# Patient Record
Sex: FEMALE | Race: WHITE | NOT HISPANIC OR LATINO
[De-identification: names, ages, dates, MRNs, and addresses within clinical notes are randomized per-mention and may not be internally consistent; named-entity substitution may affect disease eponyms.]

---

## 2018-08-17 ENCOUNTER — RESULT REVIEW (OUTPATIENT)
Age: 63
End: 2018-08-17

## 2022-08-16 ENCOUNTER — APPOINTMENT (OUTPATIENT)
Dept: ORTHOPEDIC SURGERY | Facility: CLINIC | Age: 67
End: 2022-08-16

## 2023-03-01 ENCOUNTER — APPOINTMENT (OUTPATIENT)
Dept: SURGERY | Facility: CLINIC | Age: 68
End: 2023-03-01
Payer: MEDICARE

## 2023-03-01 VITALS
TEMPERATURE: 97.1 F | HEIGHT: 64 IN | SYSTOLIC BLOOD PRESSURE: 122 MMHG | BODY MASS INDEX: 23.9 KG/M2 | HEART RATE: 82 BPM | DIASTOLIC BLOOD PRESSURE: 80 MMHG | OXYGEN SATURATION: 99 % | WEIGHT: 140 LBS

## 2023-03-01 DIAGNOSIS — Z83.3 FAMILY HISTORY OF DIABETES MELLITUS: ICD-10-CM

## 2023-03-01 PROCEDURE — 99204 OFFICE O/P NEW MOD 45 MIN: CPT

## 2023-03-01 RX ORDER — ATORVASTATIN CALCIUM 40 MG/1
40 TABLET, FILM COATED ORAL
Refills: 0 | Status: ACTIVE | COMMUNITY

## 2023-03-01 NOTE — PHYSICAL EXAM
[Normal Thyroid] : the thyroid was normal [Normal Breath Sounds] : Normal breath sounds [Normal Heart Sounds] : normal heart sounds [Normal Rate and Rhythm] : normal rate and rhythm [No HSM] : no hepatosplenomegaly [de-identified] :   Healthy [de-identified] :  no adenopathy [de-identified] :  moderate to large in size and symmetrical, mixed fatty and glandular consistency, pendulous and free of any nipple discharge, nipple retraction, suspicious skin changes bilaterally.  No discrete masses or suspicious areas palpable in the left breast.  8 to 10 mm irregular nodule palpable at the core biopsy site in the right breast 3:00 axis, consistent with biopsy site reaction.  Minimal local ecchymosis only.  No other right breast lesions palpable.  No axillary adenopathy bilaterally.

## 2023-03-01 NOTE — HISTORY OF PRESENT ILLNESS
[de-identified] :  the patient comes with her sister, who is an RN, for treatment of a biopsy-proven right breast cancer.  She did not notice any recent symptoms or changes in either breast and she has no prior history of any other breast disorders or breast surgery.  There is no family history of breast cancer.  A routine screening mammogram showed a new asymmetric density in the right breast which was confirmed on diagnostic imaging, after which a core biopsy was performed.\par \par Last GYN examination was 3 years ago, menarche was at age 11 and first pregnancy was at age 26.  She is a  who never nursed or used hormones and her menopause was at age 54

## 2023-03-01 NOTE — DATA REVIEWED
[FreeTextEntry1] : Reviewed reports of most recent breast imaging studies and right breast core biopsy.  Images unavailable at this time.\par \par Biopsy showed a 4 mm invasive carcinoma, mixed ductal and lobular, strongly HR positive, H ER negative, Ki-67 not performed.

## 2023-03-01 NOTE — ASSESSMENT
[FreeTextEntry1] : [id number]   Invasive carcinoma of the right breast, mixed ductal and lobular histology.  The nature of the problem was discussed in full and I believe that overall her prognosis will be excellent, as this is a stage I lesion in a postmenopausal female, strongly HR positive and H ER negative.  We will obtain the biopsy slides for review and confirmation of the diagnosis.  The patient will obtain the breast imaging studies on disc for local review, and a bilateral breast MRI will be done to evaluate for any other areas of concern in either breast.  They understand that additional biopsies may be necessary based on the MRI results.  She will also be referred for a consultation with our genetics counselor for possible BRCA testing, especially as it may impact her siblings and children.\par \par Barring the need for further work-up thereafter, we will proceed with definitive surgical management.  We discussed in detail the options of mastectomy, with or without immediate reconstruction, as opposed to wide local excision of the tumor site with preoperative localization and probable postoperative breast radiotherapy.  We also discussed axillary staging via formal lymph node dissection or sentinel lymph node biopsy.  The relative risks and benefits of these options were discussed in full.  They understand the increased risk of local recurrence with breast preservation, which could require a mastectomy in the future, and the small possibility of false negative axillary staging with sentinel lymph node biopsy, which would have obvious effects on her staging, her treatment decisions, and possibly her prognosis.  I believe she is an excellent candidate for the lesser surgical options and the details of the surgeries were explained in full.  After surgery she will be referred for medical and radiation oncology evaluations, and will likely be offered endocrine therapy  if she is node negative.  They will return in about 2 weeks for further discussion and final management decisions.  The case will be discussed at our multidisciplinary breast cancer conference.  The option to obtain additional surgical opinions was also offered.  All their questions were answered and they are happy with the assessment and plan.
none

## 2023-03-03 ENCOUNTER — NON-APPOINTMENT (OUTPATIENT)
Age: 68
End: 2023-03-03

## 2023-03-09 ENCOUNTER — APPOINTMENT (OUTPATIENT)
Dept: HEMATOLOGY ONCOLOGY | Facility: CLINIC | Age: 68
End: 2023-03-09

## 2023-03-09 ENCOUNTER — LABORATORY RESULT (OUTPATIENT)
Age: 68
End: 2023-03-09

## 2023-03-14 NOTE — DISCUSSION/SUMMARY
[FreeTextEntry1] : REASON FOR VISIT:\par Ms. Magda Mcbride is a 67-year-old female who was referred by Dr. Karsten Jerome for cancer genetic counseling and risk assessment due to a personal history of breast cancer. The patient was seen on 3/9/2023 through [facility]. The patient was unaccompanied.\par \par RELEVANT MEDICAL AND SURGICAL HISTORY:\par The patient was recently diagnosed with [id number], ER+/CA+/HER2-, mixed ductal and lobular invasive carcinoma of the right breast at the age of 67. She met with Dr. Karsten Jerome on 3/1/2023 to discuss her diagnosis. She will be going for a breast MRI next week.\par \par OTHER MEDICAL AND SURGICAL HISTORY:\par • None \par \par PAST OB/GYN HISTORY:\par Obstetrical History: \par Age at Menarche: 11\par Post-Menopausal: 54\par Age at First Live Birth: 26\par Oral Contraceptive Use: None\par Hormone Replacement Therapy: None\par \par CANCER SCREENING HISTORY: \par BREAST: Patient reported no prior biopsies.\par GYN: Last visit 10/2022. Frequency: annual. Patient reported no abnormalities.\par Colon: Last colonoscopy 8-9 years ago, the patient reported no polyps. Frequency: 10 years.\par Skin: Patient reported benign skin tags removed. Frequency: annual.\par \par SOCIAL HISTORY:\par • Tobacco-product use: None\par \par FAMILY HISTORY:\par Paternal ancestry was reported as [place]i and maternal ancestry was reported as Somali. A detailed family history of cancer was ascertained, see below for pedigree. Of note:\par • Maternal grandfather with a possible abdominal cancer at age 24\par • Paternal grandfather with unknown cancer in his 70s\par \par The remaining family history is unremarkable. According to the patient there are no other known cases of significant cancers in the family. To her knowledge no one else in the family has had germline testing for cancer susceptibility. \par 	\par RISK ASSESSMENT:\par Ms. Mcbride's personal and family history of cancer may be suggestive of a hereditary cancer susceptibility syndrome. Variants in breast cancer susceptibility genes were of specific concern. \par 	 \par We discussed the option of genetic testing for a breast cancer panel.  \par \par We discussed the risks, benefits and limitations, financial cost and implications of genetic testing. We also discussed the psychosocial implications of genetic testing. Possible test results were reviewed with the patient, along with associated medical management options. The Genetic Information Non-discrimination Act (ABBE) was also reviewed.\par \par After further discussion, the patient expressed hesitancy and anxiety regarding the test. We discussed the pros and cons of testing. After a detailed discussion, the patient declined testing at this time. She was informed we would be happy to see and test her in the future if she changes her mind. No further testing was ordered today.\par \par PLAN:\par 1. We reviewed the pros and cons of genetic testing for hereditary cancer predisposition.\par 2. After a detailed discussion, the patient declined testing. No further testing was ordered today. \par \par For any additional questions please call Cancer Genetics at [phone] or [phone].\par \par \par Jemima Sifuentes MS, [facility]\par Genetic Counselor, Cancer Genetics\par \par \par

## 2023-03-16 ENCOUNTER — OUTPATIENT (OUTPATIENT)
Dept: OUTPATIENT SERVICES | Facility: HOSPITAL | Age: 68
LOS: 1 days | End: 2023-03-16
Payer: MEDICARE

## 2023-03-16 ENCOUNTER — RESULT REVIEW (OUTPATIENT)
Age: 68
End: 2023-03-16

## 2023-03-16 DIAGNOSIS — C50.911 MALIGNANT NEOPLASM OF UNSPECIFIED SITE OF RIGHT FEMALE BREAST: ICD-10-CM

## 2023-03-16 DIAGNOSIS — Z00.8 ENCOUNTER FOR OTHER GENERAL EXAMINATION: ICD-10-CM

## 2023-03-16 PROCEDURE — 77049 MRI BREAST C-+ W/CAD BI: CPT | Mod: 52

## 2023-03-16 PROCEDURE — A9579: CPT

## 2023-03-16 PROCEDURE — 77049 MRI BREAST C-+ W/CAD BI: CPT | Mod: 26,52

## 2023-03-17 DIAGNOSIS — C50.911 MALIGNANT NEOPLASM OF UNSPECIFIED SITE OF RIGHT FEMALE BREAST: ICD-10-CM

## 2023-03-20 ENCOUNTER — APPOINTMENT (OUTPATIENT)
Dept: SURGERY | Facility: CLINIC | Age: 68
End: 2023-03-20
Payer: MEDICARE

## 2023-03-20 VITALS
HEART RATE: 82 BPM | OXYGEN SATURATION: 98 % | WEIGHT: 140 LBS | BODY MASS INDEX: 23.9 KG/M2 | TEMPERATURE: 96.8 F | DIASTOLIC BLOOD PRESSURE: 84 MMHG | HEIGHT: 64 IN | SYSTOLIC BLOOD PRESSURE: 122 MMHG

## 2023-03-20 PROCEDURE — 99214 OFFICE O/P EST MOD 30 MIN: CPT

## 2023-03-20 RX ORDER — LOSARTAN POTASSIUM AND HYDROCHLOROTHIAZIDE 25; 100 MG/1; MG/1
100-25 TABLET ORAL
Qty: 90 | Refills: 0 | Status: ACTIVE | COMMUNITY
Start: 2023-02-09

## 2023-03-20 RX ORDER — LEVOTHYROXINE SODIUM 0.03 MG/1
25 TABLET ORAL
Qty: 90 | Refills: 0 | Status: ACTIVE | COMMUNITY
Start: 2023-02-09

## 2023-03-20 NOTE — PHYSICAL EXAM
[de-identified] :   Healthy [de-identified] :  no adenopathy [de-identified] :  moderate to large in size, symmetrical and pendulous.  Resolving ecchymosis in the inner aspect of the right breast.  No palpable masses or suspicious areas noted bilaterally.  No axillary adenopathy bilaterally.

## 2023-03-20 NOTE — HISTORY OF PRESENT ILLNESS
[de-identified] :  the patient returns with her sister for further discussion regarding the management of her right breast cancer.  She notes no new symptoms or changes in either breast region.  She was seen by the genetic  counselor and decided against BRCA testing at this time.\par \par T1a N0, HR positive, H ER negative invasive ductal carcinoma of the right breast.

## 2023-03-20 NOTE — REASON FOR VISIT
[Follow-Up: _____] : a [unfilled] follow-up visit [Family Member] : family member [FreeTextEntry1] : Right breast cancer

## 2023-03-20 NOTE — DATA REVIEWED
[FreeTextEntry1] :   Breast MRI showed no other areas of concern in either breast and no regional lymphadenopathy.\par \par In-house review of the biopsy slides confirmed the existing diagnosis.

## 2023-03-20 NOTE — ASSESSMENT
[FreeTextEntry1] :   cT1aN0 right breast IDC, HR positive, H ER negative, no Ki-67 done on the biopsy specimen.  The details of the diagnosis were discussed in full along with management options and their relative risks and benefits we discussed management of the primary lesion with mastectomy and possible immediate reconstruction versus wide local excision with preoperative localization and probable postoperative radiation therapy.  We discussed management of the axilla with axillary dissection versus sentinel lymph node biopsy.  They understand the increased local recurrence risk with breast preservation, which may require a mastectomy in the future, and the small chance of false negative axillary staging with sentinel node biopsy, which would have obvious effects on her treatment decisions and possibly her prognosis.  I believe she is an excellent candidate for breast preservation and sentinel lymph node biopsy.  Postoperatively she will have medical and radiation oncology evaluations, and the option to obtain additional surgical opinions preoperatively was also discussed and declined.  We also discussed the possible cosmetic sequela I with breast preservation and postop breast radiotherapy.  All their questions were answered and they are happy with the assessment and recommendations and we will schedule the surgery promptly.  A surgical consent was obtained at this office visit.

## 2023-03-27 ENCOUNTER — RESULT REVIEW (OUTPATIENT)
Age: 68
End: 2023-03-27

## 2023-03-27 ENCOUNTER — OUTPATIENT (OUTPATIENT)
Dept: OUTPATIENT SERVICES | Facility: HOSPITAL | Age: 68
LOS: 1 days | End: 2023-03-27
Payer: MEDICARE

## 2023-03-27 VITALS
OXYGEN SATURATION: 100 % | HEART RATE: 80 BPM | HEIGHT: 64 IN | RESPIRATION RATE: 18 BRPM | DIASTOLIC BLOOD PRESSURE: 82 MMHG | SYSTOLIC BLOOD PRESSURE: 143 MMHG | TEMPERATURE: 98 F | WEIGHT: 141.1 LBS

## 2023-03-27 DIAGNOSIS — Z98.890 OTHER SPECIFIED POSTPROCEDURAL STATES: Chronic | ICD-10-CM

## 2023-03-27 DIAGNOSIS — Z01.818 ENCOUNTER FOR OTHER PREPROCEDURAL EXAMINATION: ICD-10-CM

## 2023-03-27 DIAGNOSIS — C50.911 MALIGNANT NEOPLASM OF UNSPECIFIED SITE OF RIGHT FEMALE BREAST: ICD-10-CM

## 2023-03-27 PROCEDURE — 99214 OFFICE O/P EST MOD 30 MIN: CPT | Mod: 25

## 2023-03-27 PROCEDURE — 71046 X-RAY EXAM CHEST 2 VIEWS: CPT

## 2023-03-27 PROCEDURE — 93005 ELECTROCARDIOGRAM TRACING: CPT

## 2023-03-27 PROCEDURE — 93010 ELECTROCARDIOGRAM REPORT: CPT

## 2023-03-27 PROCEDURE — 71046 X-RAY EXAM CHEST 2 VIEWS: CPT | Mod: 26

## 2023-03-27 NOTE — H&P PST ADULT - HISTORY OF PRESENT ILLNESS
Pt denies cp palp uri cough dysuria or sob.   ET: 2 -3 FOS- denies SOB .  ET 2-3 FLAT BLOCKS DENIES SOB       PT denies any open wounds, drainage or rashes. . aware to self quaerantine preop. all instructions reviewed.  SCHEDULED  FOR 4/12/23 BRIDGET EXCISIONAL BIOPSY OF RIGHT BREAST  MASS WITH PREOP RADIOFREQUENCY ALIZER AND SENTINEL LYMPH NODE BIOPSY RIGHT AXIILLA  DENIES PAIN  RECENT MAMMO AND ULTRASOUND /BIOPSY  As per patient, this is their complete medical and surgical history, including medications both prescribed or over the counter.  Patient verbalized understanding of instructions and was given the opportunity to ask questions and have them answered.  Patient denies any signs or symptoms of COVID 19 and denies contact with known positive individuals.  They have an appointment for repeat COVID testing pre-procedure and acknowledge its time and place.  They were instructed to quarantine pre-procedure, practice exposure control measures, continue to self-monitor and report any concerns to their proceduralist.    Anesthesia Alert  NO--Difficult Airway  NO--History of neck surgery or radiation  NO--Limited ROM of neck  NO--History of Malignant hyperthermia  NO--Personal or family history of Pseudocholinesterase deficiency.  yes--Prior Anesthesia Complication   nausea/vomit post op  NO--Latex Allergy  NO--Loose teeth  NO--History of Rheumatoid Arthritis  NO--NABILA  NO--Bleeding risk  NO--Other_____

## 2023-03-28 DIAGNOSIS — Z01.818 ENCOUNTER FOR OTHER PREPROCEDURAL EXAMINATION: ICD-10-CM

## 2023-03-28 DIAGNOSIS — C50.911 MALIGNANT NEOPLASM OF UNSPECIFIED SITE OF RIGHT FEMALE BREAST: ICD-10-CM

## 2023-04-10 ENCOUNTER — RESULT REVIEW (OUTPATIENT)
Age: 68
End: 2023-04-10

## 2023-04-10 ENCOUNTER — OUTPATIENT (OUTPATIENT)
Dept: OUTPATIENT SERVICES | Facility: HOSPITAL | Age: 68
LOS: 1 days | End: 2023-04-10
Payer: MEDICARE

## 2023-04-10 ENCOUNTER — NON-APPOINTMENT (OUTPATIENT)
Age: 68
End: 2023-04-10

## 2023-04-10 DIAGNOSIS — R92.8 OTHER ABNORMAL AND INCONCLUSIVE FINDINGS ON DIAGNOSTIC IMAGING OF BREAST: ICD-10-CM

## 2023-04-10 DIAGNOSIS — Z98.890 OTHER SPECIFIED POSTPROCEDURAL STATES: Chronic | ICD-10-CM

## 2023-04-10 DIAGNOSIS — C50.911 MALIGNANT NEOPLASM OF UNSPECIFIED SITE OF RIGHT FEMALE BREAST: ICD-10-CM

## 2023-04-10 DIAGNOSIS — Z85.3 PERSONAL HISTORY OF MALIGNANT NEOPLASM OF BREAST: ICD-10-CM

## 2023-04-10 PROCEDURE — 19281 PERQ DEVICE BREAST 1ST IMAG: CPT | Mod: RT

## 2023-04-10 PROCEDURE — A4648: CPT

## 2023-04-11 ENCOUNTER — RESULT REVIEW (OUTPATIENT)
Age: 68
End: 2023-04-11

## 2023-04-11 ENCOUNTER — OUTPATIENT (OUTPATIENT)
Dept: OUTPATIENT SERVICES | Facility: HOSPITAL | Age: 68
LOS: 1 days | End: 2023-04-11
Payer: MEDICARE

## 2023-04-11 DIAGNOSIS — C50.919 MALIGNANT NEOPLASM OF UNSPECIFIED SITE OF UNSPECIFIED FEMALE BREAST: ICD-10-CM

## 2023-04-11 DIAGNOSIS — Z98.890 OTHER SPECIFIED POSTPROCEDURAL STATES: Chronic | ICD-10-CM

## 2023-04-11 DIAGNOSIS — Z00.8 ENCOUNTER FOR OTHER GENERAL EXAMINATION: ICD-10-CM

## 2023-04-11 PROCEDURE — 78195 LYMPH SYSTEM IMAGING: CPT

## 2023-04-11 PROCEDURE — 78195 LYMPH SYSTEM IMAGING: CPT | Mod: 26

## 2023-04-11 PROCEDURE — A9541: CPT

## 2023-04-11 NOTE — ASU PATIENT PROFILE, ADULT - FALL HARM RISK - PT AGE POPULATION HIDDEN
Call to patient; advised of recommendations of Nathaly Gallegos NP.  Patient agreeable.  Follow up appointment scheduled.    Adult

## 2023-04-11 NOTE — ASU PATIENT PROFILE, ADULT - FALL HARM RISK - UNIVERSAL INTERVENTIONS
Bed in lowest position, wheels locked, appropriate side rails in place/Call bell, personal items and telephone in reach/Instruct patient to call for assistance before getting out of bed or chair/Non-slip footwear when patient is out of bed/[place] to call system/Physically safe environment - no spills, clutter or unnecessary equipment/Purposeful Proactive Rounding/Room/bathroom lighting operational, light cord in reach

## 2023-04-12 ENCOUNTER — TRANSCRIPTION ENCOUNTER (OUTPATIENT)
Age: 68
End: 2023-04-12

## 2023-04-12 ENCOUNTER — RESULT REVIEW (OUTPATIENT)
Age: 68
End: 2023-04-12

## 2023-04-12 ENCOUNTER — OUTPATIENT (OUTPATIENT)
Dept: INPATIENT UNIT | Facility: HOSPITAL | Age: 68
LOS: 1 days | Discharge: HOME | End: 2023-04-12
Payer: MEDICARE

## 2023-04-12 ENCOUNTER — APPOINTMENT (OUTPATIENT)
Dept: SURGERY | Facility: AMBULATORY SURGERY CENTER | Age: 68
End: 2023-04-12

## 2023-04-12 VITALS
DIASTOLIC BLOOD PRESSURE: 61 MMHG | OXYGEN SATURATION: 99 % | SYSTOLIC BLOOD PRESSURE: 132 MMHG | RESPIRATION RATE: 20 BRPM | HEART RATE: 59 BPM | TEMPERATURE: 98 F

## 2023-04-12 VITALS
HEART RATE: 72 BPM | WEIGHT: 141.1 LBS | TEMPERATURE: 97 F | OXYGEN SATURATION: 100 % | DIASTOLIC BLOOD PRESSURE: 64 MMHG | SYSTOLIC BLOOD PRESSURE: 123 MMHG | RESPIRATION RATE: 18 BRPM | HEIGHT: 64 IN

## 2023-04-12 DIAGNOSIS — Z98.890 OTHER SPECIFIED POSTPROCEDURAL STATES: Chronic | ICD-10-CM

## 2023-04-12 DIAGNOSIS — C50.911 MALIGNANT NEOPLASM OF UNSPECIFIED SITE OF RIGHT FEMALE BREAST: ICD-10-CM

## 2023-04-12 DIAGNOSIS — C50.919 MALIGNANT NEOPLASM OF UNSPECIFIED SITE OF UNSPECIFIED FEMALE BREAST: ICD-10-CM

## 2023-04-12 PROCEDURE — 88307 TISSUE EXAM BY PATHOLOGIST: CPT

## 2023-04-12 PROCEDURE — 88341 IMHCHEM/IMCYTCHM EA ADD ANTB: CPT

## 2023-04-12 PROCEDURE — 12032 INTMD RPR S/A/T/EXT 2.6-7.5: CPT | Mod: 59

## 2023-04-12 PROCEDURE — 19301 PARTIAL MASTECTOMY: CPT | Mod: RT

## 2023-04-12 PROCEDURE — 88342 IMHCHEM/IMCYTCHM 1ST ANTB: CPT | Mod: 26

## 2023-04-12 PROCEDURE — 88341 IMHCHEM/IMCYTCHM EA ADD ANTB: CPT | Mod: 26

## 2023-04-12 PROCEDURE — 38525 BIOPSY/REMOVAL LYMPH NODES: CPT | Mod: RT

## 2023-04-12 PROCEDURE — C1889: CPT

## 2023-04-12 PROCEDURE — 88305 TISSUE EXAM BY PATHOLOGIST: CPT | Mod: 26

## 2023-04-12 PROCEDURE — 88305 TISSUE EXAM BY PATHOLOGIST: CPT

## 2023-04-12 PROCEDURE — 88342 IMHCHEM/IMCYTCHM 1ST ANTB: CPT

## 2023-04-12 PROCEDURE — 88307 TISSUE EXAM BY PATHOLOGIST: CPT | Mod: 26

## 2023-04-12 PROCEDURE — 38900 IO MAP OF SENT LYMPH NODE: CPT | Mod: RT

## 2023-04-12 RX ORDER — HYDROMORPHONE HYDROCHLORIDE 2 MG/ML
0.5 INJECTION INTRAMUSCULAR; INTRAVENOUS; SUBCUTANEOUS
Refills: 0 | Status: DISCONTINUED | OUTPATIENT
Start: 2023-04-12 | End: 2023-04-12

## 2023-04-12 RX ORDER — LEVOTHYROXINE SODIUM 125 MCG
1 TABLET ORAL
Refills: 0 | DISCHARGE

## 2023-04-12 RX ORDER — ONDANSETRON 8 MG/1
4 TABLET, FILM COATED ORAL ONCE
Refills: 0 | Status: DISCONTINUED | OUTPATIENT
Start: 2023-04-12 | End: 2023-04-12

## 2023-04-12 RX ORDER — SODIUM CHLORIDE 9 MG/ML
1000 INJECTION, SOLUTION INTRAVENOUS
Refills: 0 | Status: DISCONTINUED | OUTPATIENT
Start: 2023-04-12 | End: 2023-04-12

## 2023-04-12 RX ORDER — ATORVASTATIN CALCIUM 80 MG/1
1 TABLET, FILM COATED ORAL
Refills: 0 | DISCHARGE

## 2023-04-12 RX ORDER — LOSARTAN/HYDROCHLOROTHIAZIDE 100MG-25MG
1 TABLET ORAL
Refills: 0 | DISCHARGE

## 2023-04-12 RX ORDER — OXYCODONE HYDROCHLORIDE 5 MG/1
5 TABLET ORAL ONCE
Refills: 0 | Status: DISCONTINUED | OUTPATIENT
Start: 2023-04-12 | End: 2023-04-12

## 2023-04-12 RX ORDER — ACETAMINOPHEN WITH CODEINE 300MG-30MG
1 TABLET ORAL
Qty: 12 | Refills: 0
Start: 2023-04-12 | End: 2023-04-14

## 2023-04-12 NOTE — ASU DISCHARGE PLAN (ADULT/PEDIATRIC) - CARE PROVIDER_API CALL
Karsten Jerome (MD)  Surgery  [address] [place] [address], [address] [place] [address]  Phone: [phone]  Fax: [phone]  Follow Up Time:

## 2023-04-12 NOTE — PRE-ANESTHESIA EVALUATION ADULT - NSANTHPMHFT_GEN_ALL_CORE
METS>4 without CP/palpitations/sob  Denies orthopnea    h/o abnormal EKG, but workup there after negative for cad per patient. very active, plays racnodilaall

## 2023-04-12 NOTE — CHART NOTE - NSCHARTNOTEFT_GEN_A_CORE
PACU ANESTHESIA ADMISSION NOTE      Procedure:   Post op diagnosis:      ____  Intubated  TV:______       Rate: ______      FiO2: ______    _x___  Patent Airway    _x___  Full return of protective reflexes    _x___  Full recovery from anesthesia / back to baseline status    Vitals:  T 97.7 f  HR: 61  BP: 126/56  RR: 16  SpO2: 100% on RA    Mental Status:  _x___ Awake   ___x__ Alert   _____ Drowsy   _____ Sedated    Nausea/Vomiting:  _x___  NO       ______Yes,   See Post - Op Orders         Pain Scale (0-10):  __0___    Treatment: _x___ None    ____ See Post - Op/PCA Orders    Post - Operative Fluids:   __x__ Oral   _x___ See Post - Op Orders    Plan: Discharge:   _x___Home       _____Floor     _____Critical Care    _____  Other:_________________    Comments: uneventful MAC, VSS, full report to pacu rn  No anesthesia issues or complications noted.  Discharge when criteria met.

## 2023-04-12 NOTE — BRIEF OPERATIVE NOTE - NSICDXBRIEFPROCEDURE_GEN_ALL_CORE_FT
PROCEDURES:  Excision of neoplasm of right breast 12-Apr-2023 13:46:48  Richar Jaffe  Biopsy, breast and sentinel lymph node 12-Apr-2023 13:47:49  Richar Jaffe

## 2023-04-12 NOTE — BRIEF OPERATIVE NOTE - OPERATION/FINDINGS
Wide local excision of right breast invasive ductal carcinoma with additional posterior, superior, inferior, medial, and lateral margins.  Specimen mammogram satisfactory, RF seed # [id number]. Right axillary entinel lymph node biopsy, 650.

## 2023-04-12 NOTE — ASU DISCHARGE PLAN (ADULT/PEDIATRIC) - ASU DC SPECIAL INSTRUCTIONSFT
You are being discharged from [facility] follow your excision of left breast mass with sentinel node biopsy. Please call the office to schedule an appointment in 2 weeks. Please take Tylenol with codeine prescribed to you for pain 1-2 tabs every 6 hours. You may also take ibuprofen 400 mg and tylenol 625 mg every 6 hours as needed for pain. Resume all home medications as instructed. Leave the dressing and bra in place for 48 hours, you may remove the dressing at this time and shower, but please leave the steristrips in place as these can get wet and will fall off on their own. You may return to your regular diet. Please increase your activity as tolerated, but no strenuous use of your left upper extremity over the next 3 weeks. Be sure to wear your sports bra as frequently as possible until follow up. Please call the office or return to the ED if you experience purulent drainage from your incision, numbness/tingling in either upper extremity, fevers greater than 100.4F, pain not controlled by your pain medications. If you have any questions or concerns please call the office with the number provided. You are being discharged from [facility] follow your excision of right breast mass with sentinel node biopsy. Please call the office to schedule an appointment in 2 weeks. Please take Tylenol with codeine prescribed to you for pain 1-2 tabs every 6 hours. You may also take ibuprofen 400 mg and tylenol 625 mg every 6 hours as needed for pain. Resume all home medications as instructed. Leave the dressing and bra in place for 48 hours, you may remove the dressing at this time and shower, but please leave the steristrips in place as these can get wet and will fall off on their own. You may return to your regular diet. Please increase your activity as tolerated, but no strenuous use of your right upper extremity over the next 3 weeks. Be sure to wear your sports bra as frequently as possible until follow up. Please call the office or return to the ED if you experience purulent drainage from your incision, numbness/tingling in either upper extremity, fevers greater than 100.4F, pain not controlled by your pain medications. If you have any questions or concerns please call the office with the number provided.

## 2023-04-12 NOTE — ASU DISCHARGE PLAN (ADULT/PEDIATRIC) - NS MD DC FALL RISK RISK
PAST SURGICAL HISTORY:  H/O: hysterectomy     S/P cataract surgery     
For information on Fall & Injury Prevention, visit: [email or web address] OR  [email or web address] OR  https://www.cdc.gov/steadi/patient.html

## 2023-04-12 NOTE — BRIEF OPERATIVE NOTE - PRIMARY SURGEON
Called Maia back. Questions answered.    [facility]    Phone Message    May a detailed message be left on voicemail: yes    Reason for Call: Other: Pt wants Dr. Bernadine Maria to know that pt went to the [facility] lab instead of [place] [facility], pt didn't drive to [place] to get pkg that was left for pt in [place], pt needs Dr. Bernadine Maria's team to call her with pkg detalis  and what medications she needs to take.. Please call the pt back, thank you     Action Taken: Message routed to:  [facility] ([facility] [place]  
Dr. Jerome

## 2023-04-12 NOTE — ASU DISCHARGE PLAN (ADULT/PEDIATRIC) - HISTORY OF COVID-19 VACCINATION
Patient returned to clinic requesting RTW extension to cover today.  RTW: 1- ([facility]) provided.    Yes

## 2023-04-18 DIAGNOSIS — C77.3 SECONDARY AND UNSPECIFIED MALIGNANT NEOPLASM OF AXILLA AND UPPER LIMB LYMPH NODES: ICD-10-CM

## 2023-04-18 DIAGNOSIS — D05.11 INTRADUCTAL CARCINOMA IN SITU OF RIGHT BREAST: ICD-10-CM

## 2023-04-18 LAB — SURGICAL PATHOLOGY STUDY: SIGNIFICANT CHANGE UP

## 2023-04-24 ENCOUNTER — APPOINTMENT (OUTPATIENT)
Dept: SURGERY | Facility: CLINIC | Age: 68
End: 2023-04-24
Payer: MEDICARE

## 2023-04-24 VITALS
TEMPERATURE: 96.8 F | HEART RATE: 87 BPM | SYSTOLIC BLOOD PRESSURE: 122 MMHG | OXYGEN SATURATION: 98 % | HEIGHT: 64 IN | BODY MASS INDEX: 24.24 KG/M2 | DIASTOLIC BLOOD PRESSURE: 82 MMHG | WEIGHT: 142 LBS

## 2023-04-24 PROCEDURE — 99024 POSTOP FOLLOW-UP VISIT: CPT

## 2023-04-24 NOTE — PHYSICAL EXAM
[de-identified] :  right breast and axillary incisions are clean and dry, with no evidence of hematoma or infection.  Breast contour is well-preserved

## 2023-04-24 NOTE — DATA REVIEWED
[FreeTextEntry1] :   Final pathology shows a 5.2 mm IDC with questionable LVI and negative margins.  Prognostic profile shows 95% ER positive, 90% NJ positive, H ER negative

## 2023-04-24 NOTE — ASSESSMENT
[FreeTextEntry1] : No postoperative problems noted; local care and activity instructions were reviewed, and she will return in about 4 weeks for reevaluation.\par \par  T1b N0i  M0 IDC right breast with a very favorable biomarker profile.   I believe that overall her prognosis will be excellent, but arrangements will be made for prompt medical and radiation oncology evaluations.  She will likely require adjuvant endocrine therapy and breast RT, but we have also requested an Oncotype RS to see if there will be any predicted benefit from adjuvant chemotherapy.   Small metallic marking clips were left in the breast at the time of surgery to delineate the lumpectomy cavity.    All their questions were answered and they understand and agree.

## 2023-04-24 NOTE — HISTORY OF PRESENT ILLNESS
[de-identified] : The patient returns with her sister for her first postoperative visit after her recent right breast lumpectomy and sentinel lymph node biopsy.  She looks and feels well and did not require any prescription analgesics.  She has no complaints regarding the surgery.

## 2023-04-25 PROBLEM — Q24.5 MALFORMATION OF CORONARY VESSELS: Chronic | Status: ACTIVE | Noted: 2023-03-27

## 2023-04-30 NOTE — HISTORY OF PRESENT ILLNESS
[de-identified] : 68 yo F with PMHx of HTN, DL, abnormal stress test (ant wall ischemia), referred for new diagnosis of right breast cancer.\par \par She had routine screening mammogram done on    which showed a new asymmetric density in the right breast which was \par confirmed on diagnostic imaging, after which a core biopsy was performed. \par Biopsy pathology confirmed right breast invasive carcinoma with mixed ductal and lobular features, ER/DC +, HER2 neg.\par \par She had MRI breast done as well. She was seen by breast surgery and is s/p right breast lumpectomy with sentinel lymph node biopsy on 23. \par \par Pathology:\par 1. R LIQ breast mass: Well differentiated ductal carcinoma. 5.2mm with focal lobular features including signet ring formation, grade 1.\par     Non-extensive DCIS, solid type, intermediate grade. \par     A focus suspicious for LVI seen. Alkl margins negative of carcinoma. \par 2. 1 LN with micrometastases 0.47mm\par pT1b pN1mi Mx AJCC stage IB\par \par Last GYN exam was 3 years ago.\par Menarche: Age 11\par 1st pregnancy at age 26\par , never nursed, no OCP use\par Menopause: Age 54\par \par She has no family hx of breast cancer. She was seen by genetic counselling and she declined genetic testing. Oncotype RS sent.\par \par

## 2023-04-30 NOTE — ASSESSMENT
[FreeTextEntry1] : \par \par Stage IB right invasive breast cancer grade 1, pT1B pN1mi Mx\par \par -RT\par -Oncotype (Tumor >0.5cm, with pN1mi)\par -Endocrine therapy +/- chemo (depending upon RS)\par -Genetic testing? declined\par

## 2023-05-01 ENCOUNTER — NON-APPOINTMENT (OUTPATIENT)
Age: 68
End: 2023-05-01

## 2023-05-01 ENCOUNTER — APPOINTMENT (OUTPATIENT)
Dept: HEMATOLOGY ONCOLOGY | Facility: CLINIC | Age: 68
End: 2023-05-01

## 2023-05-01 PROBLEM — E03.9 HYPOTHYROIDISM, UNSPECIFIED: Chronic | Status: ACTIVE | Noted: 2023-03-27

## 2023-05-01 PROBLEM — I10 ESSENTIAL (PRIMARY) HYPERTENSION: Chronic | Status: ACTIVE | Noted: 2023-03-27

## 2023-05-04 ENCOUNTER — NON-APPOINTMENT (OUTPATIENT)
Age: 68
End: 2023-05-04

## 2023-05-15 ENCOUNTER — OUTPATIENT (OUTPATIENT)
Dept: OUTPATIENT SERVICES | Facility: HOSPITAL | Age: 68
LOS: 1 days | End: 2023-05-15
Payer: MEDICARE

## 2023-05-15 ENCOUNTER — APPOINTMENT (OUTPATIENT)
Dept: HEMATOLOGY ONCOLOGY | Facility: CLINIC | Age: 68
End: 2023-05-15
Payer: MEDICARE

## 2023-05-15 VITALS
RESPIRATION RATE: 16 BRPM | HEIGHT: 64 IN | BODY MASS INDEX: 24.24 KG/M2 | TEMPERATURE: 98.6 F | DIASTOLIC BLOOD PRESSURE: 60 MMHG | SYSTOLIC BLOOD PRESSURE: 136 MMHG | HEART RATE: 78 BPM | WEIGHT: 142 LBS | OXYGEN SATURATION: 99 %

## 2023-05-15 DIAGNOSIS — Z98.890 OTHER SPECIFIED POSTPROCEDURAL STATES: Chronic | ICD-10-CM

## 2023-05-15 DIAGNOSIS — C50.911 MALIGNANT NEOPLASM OF UNSPECIFIED SITE OF RIGHT FEMALE BREAST: ICD-10-CM

## 2023-05-15 PROCEDURE — 99205 OFFICE O/P NEW HI 60 MIN: CPT

## 2023-05-16 ENCOUNTER — APPOINTMENT (OUTPATIENT)
Dept: RADIATION ONCOLOGY | Facility: HOSPITAL | Age: 68
End: 2023-05-16
Payer: MEDICARE

## 2023-05-16 ENCOUNTER — OUTPATIENT (OUTPATIENT)
Dept: OUTPATIENT SERVICES | Facility: HOSPITAL | Age: 68
LOS: 1 days | End: 2023-05-16
Payer: MEDICARE

## 2023-05-16 VITALS
WEIGHT: 144 LBS | TEMPERATURE: 97.7 F | HEIGHT: 64 IN | BODY MASS INDEX: 24.59 KG/M2 | SYSTOLIC BLOOD PRESSURE: 133 MMHG | RESPIRATION RATE: 16 BRPM | OXYGEN SATURATION: 97 % | DIASTOLIC BLOOD PRESSURE: 63 MMHG | HEART RATE: 67 BPM

## 2023-05-16 DIAGNOSIS — C50.911 MALIGNANT NEOPLASM OF UNSPECIFIED SITE OF RIGHT FEMALE BREAST: ICD-10-CM

## 2023-05-16 DIAGNOSIS — Z86.39 PERSONAL HISTORY OF OTHER ENDOCRINE, NUTRITIONAL AND METABOLIC DISEASE: ICD-10-CM

## 2023-05-16 DIAGNOSIS — Z98.890 OTHER SPECIFIED POSTPROCEDURAL STATES: Chronic | ICD-10-CM

## 2023-05-16 DIAGNOSIS — I10 ESSENTIAL (PRIMARY) HYPERTENSION: ICD-10-CM

## 2023-05-16 DIAGNOSIS — Z78.9 OTHER SPECIFIED HEALTH STATUS: ICD-10-CM

## 2023-05-16 DIAGNOSIS — E78.5 HYPERLIPIDEMIA, UNSPECIFIED: ICD-10-CM

## 2023-05-16 DIAGNOSIS — Z87.898 PERSONAL HISTORY OF OTHER SPECIFIED CONDITIONS: ICD-10-CM

## 2023-05-16 PROCEDURE — 99205 OFFICE O/P NEW HI 60 MIN: CPT

## 2023-05-16 RX ORDER — LEVOTHYROXINE SODIUM 75 UG/1
75 TABLET ORAL
Refills: 0 | Status: DISCONTINUED | COMMUNITY
End: 2023-05-16

## 2023-05-16 RX ORDER — LOSARTAN POTASSIUM 100 MG/1
100 TABLET, FILM COATED ORAL
Refills: 0 | Status: DISCONTINUED | COMMUNITY
End: 2023-05-16

## 2023-05-16 RX ORDER — ACETAMINOPHEN AND CODEINE PHOSPHATE 300; 15 MG/1; MG/1
300-15 TABLET ORAL
Qty: 12 | Refills: 0 | Status: DISCONTINUED | COMMUNITY
Start: 2023-04-12 | End: 2023-05-16

## 2023-05-16 RX ORDER — BUSPIRONE HYDROCHLORIDE 10 MG/1
10 TABLET ORAL
Qty: 60 | Refills: 0 | Status: DISCONTINUED | COMMUNITY
Start: 2023-01-27 | End: 2023-05-16

## 2023-05-16 NOTE — VITALS
[Date: ____________] : Patient's last distress assessment performed on [unfilled]. [3 - Distress Level] : Distress Level: 3 [Referred Patient  to social work for follow-up] : Patient was referred to social work for follow-up [Patient given social work contact information and resource sheet] : Patient was given social work contact information and resource sheet [Maximal Pain Intensity: 0/10] : 0/10 [90: Able to carry normal activity; minor signs or symptoms of disease.] : 90: Able to carry normal activity; minor signs or symptoms of disease.

## 2023-05-17 ENCOUNTER — NON-APPOINTMENT (OUTPATIENT)
Age: 68
End: 2023-05-17

## 2023-05-17 NOTE — REVIEW OF SYSTEMS
[Negative] : Psychiatric [Patient Intake Form Reviewed] : Patient intake form was reviewed [Chest Pain] : no chest pain [Palpitations] : no palpitations [Shortness Of Breath] : no shortness of breath [Wheezing] : no wheezing [Cough] : no cough [Hot Flashes] : no hot flashes

## 2023-05-17 NOTE — HISTORY OF PRESENT ILLNESS
[FreeTextEntry1] : \par The patient is a 67 year old woman who was noted on screening mammogram (1/10/2023) to have focal asymmetry in the right breast, lower inner quadrant.  Diagnostic mammogram and ultrasound on 1/25/2023 showed  right breast, lower inner asymmetry/distortion appears concerning.  BI-RADS 4: Suspicious. Recommend stereotactic biopsy.  \par \par On 2/16/2023  she had a biopsy of the right breast abnormality, lower inner and pathology showed invasive mammary carcinoma with mixed ductal and lobular features.  \par \par On 3/9/2023, outside slides were reviewed at [facility] from [facility] and pathology showed, invasive well to moderately differentiated ductal carcinoma with focal lobular features and signet ring cell formation.  Atrophic predominantly fatty breast tissue with a microscopic  perilobular capillary hemangioma. ER/DE positive, HER 2 negative.\par \par She also had an MRI of bilateral breasts on 3/16/2023, which showed right breast, small irregular mass in the right medial breast measures 0.8 x 0.7 x 0.5 cm, consistent with the biopsy-proven carcinoma. Biopsy clip along its inferior aspect. No additional suspicious abnormal enhancement in the right breast.   In the left breast, no suspicious abnormal enhancement in the left breast.\par No axillary or internal mammary lymphadenopathy.\par \par On 4/12/2023 , She underwent a lumpectomy and sentinel node biopsy.  She was found to have a 5.2 mm  invasive ductal carcinoma, with DCIS, negative for extensive intraductal component, ER/DE positive.  Surgical margins were negative. 1/1 positive sentinel lymph node without CAMERON.   There was focal positive LVSI / no PNI.  \par \par She has been doing well since surgery.  She is here to discuss radiation. \par Med/Onc: Dr. Espinoza 8/14/2023\par Oncotype:   10\par Dr. Jerome 5/22/2023\par

## 2023-05-17 NOTE — DISEASE MANAGEMENT
[Pathological] : TNM Stage: p [IA] : IA [FreeTextEntry4] : invasive ductal carcinoma of the right breast, G1, ER/[place] positive / HER 2 negative [TTNM] : 1b [NTNM] : 1mi [MTNM] : 0 [de-identified] : right breast

## 2023-05-17 NOTE — PHYSICAL EXAM
[Sclera] : the sclera and conjunctiva were normal [Hearing Threshold Finger Rub Not [place]] : hearing was normal [Heart Rate And Rhythm] : heart rate and rhythm were normal [Heart Sounds] : normal S1 and S2 [Murmurs] : no murmurs present [Edema] : no peripheral edema present [No Discharge] : no discharge [No Masses] : no breast masses were palpable [Abdomen Soft] : soft [Nondistended] : nondistended [Abdomen Tenderness] : non-tender [Cervical Lymph Nodes Enlarged Posterior Bilaterally] : posterior cervical [Cervical Lymph Nodes Enlarged Anterior Bilaterally] : anterior cervical [Supraclavicular Lymph Nodes Enlarged Bilaterally] : supraclavicular [Nail Clubbing] : no clubbing  or cyanosis of the fingernails [Skin Color & Pigmentation] : normal skin color and pigmentation [No Focal Deficits] : no focal deficits [Motor Exam] : the motor exam was normal [Enlargement Of The Right Breast] : no swelling [Tenderness Of The Right Breast] : no tenderness [___] :  no erythema [Enlargement Of The Left Breast] : no swelling [Tenderness Of The Left Breast] : no tenderness [Axillary Lymph Nodes Enlarged Bilaterally] : no enlarged nodes [Normal] : no palpable adenopathy [de-identified] : N

## 2023-05-17 NOTE — LETTER CLOSING
[Consult Closing:] : Thank you for allowing me to participate in the care of this patient.  If you have any questions, please do not hesitate to contact me. [Sincerely yours,] : Sincerely yours, [FreeTextEntry3] : Estrella Weeks M.D. \par \par Electronically proofread and signed by:  Estrella Weeks MD\par Attending, Department of Radiation Medicine\par [facility]\par \par CC: Dr. Espinoza

## 2023-05-19 ENCOUNTER — NON-APPOINTMENT (OUTPATIENT)
Age: 68
End: 2023-05-19

## 2023-05-19 ENCOUNTER — OUTPATIENT (OUTPATIENT)
Dept: OUTPATIENT SERVICES | Facility: HOSPITAL | Age: 68
LOS: 1 days | End: 2023-05-19
Payer: MEDICARE

## 2023-05-19 DIAGNOSIS — Z98.890 OTHER SPECIFIED POSTPROCEDURAL STATES: Chronic | ICD-10-CM

## 2023-05-19 PROCEDURE — 77263 THER RADIOLOGY TX PLNG CPLX: CPT

## 2023-05-19 PROCEDURE — 77333 RADIATION TREATMENT AID(S): CPT | Mod: 26

## 2023-05-19 PROCEDURE — 77290 THER RAD SIMULAJ FIELD CPLX: CPT | Mod: 26

## 2023-05-19 NOTE — PHYSICAL EXAM
[Fully active, able to carry on all pre-disease performance without restriction] : Status 0 - Fully active, able to carry on all pre-disease performance without restriction [Normal] : affect appropriate [de-identified] : S/P right breast lumpectomy and eight axillary SLNB. Surgical incisions are healing well. There is no palpable abnormality.

## 2023-05-19 NOTE — HISTORY OF PRESENT ILLNESS
[T: ___] : T[unfilled] [N: ___] : N[unfilled] [M: ___] : M[unfilled] [AJCC Stage: ____] : AJCC Stage: [unfilled] [de-identified] : 67 F w/ PMH of HTN and HLD was recently diagnosed T1b N1i M0 IDC right breast with (ER 95%/VA 90%/HER2 IHC 0), grade 1 and she is referred by Dr. Jerome for consultation of adjuvant systemic therapy.\par \par History goes back to 1/10/23 when she had a routine screening mammogram which showed a new right breast focal asymmetry in the lower inner R breast, anterior depth. She subsequently underwent R breast biopsy on 23. Biopsy showed invasive well to moderately differentiated ductal carcinoma with focal lobular features and signet ring cell formation, ER+ (95%), VA+ (90%) and HER2- (0 by IHC). She was referrred to Dr. Jerome and had MRI breast that showed biopsy-proven carcinoma in the right breast and no MRI evidence of malignancy in the left breast. \par \par On 23, she underwent right LIQ mass, radiofrequency seed localized lumpectomy and right axillary SLNB. The pathology revealed 5.2 mm invasive well differentiated ductal\par carcinoma  with focal lobular features including signet ring cell formation. Non-extensive ductal carcinoma in situ (DCIS), solid type, intermediate nuclear grade. A focus suspicious for lymphovascular invasion is seen. All margins are negative for malignancy. One SLN is positive micro-metastatic carcinoma with the largest contiguous focus of which measures 0.47 mm. No extracapsular extension is identified. AJCC 8th Edition Pathologic Stage: pT1b, p(sn)N1mi, pMx.\par Oncotype RS is 10. \par \par She has recovered well from surgery and is here today with her sister to discuss systemic adjuvant therapy. \par \par PMH: HTN, HLD and hypothyroidism\par PSH: none\par Gyn hx: Menarche age 11, menopause age 54, , no breast feeding, no breast bx, no hx of OCP or HRT use\par Social: Social alcohol use, denies tobacco use\par Family hx: no family hx of cancer.

## 2023-05-19 NOTE — OB HISTORY
[Menarche Age: ____] : age at menarche was [unfilled] [Currently In Menopause] : currently in menopause [Post-Menopause, No Sxs] : post-menopausal, currently without symptoms [Menopause Age: ____] : age at menopause was [unfilled] [___] : Full Term: [unfilled] [Experiencing Menopausal Sxs] : not experiencing menopausal symptoms

## 2023-05-19 NOTE — ASSESSMENT
[FreeTextEntry1] : 66 yo female has state IB ([id number]) IDC of the right breast right breast, G1, ER/AL positive and Her-2 negative, s/p right breast lumpectomy and right axillary SLNB with negative margins. \par Oncotype RS is 10, in low risk range. \par \par Assessment and Plan:\par We had a detailed discussion with the patient and her sister regarding to her diagnosis, stage, prognosis and adjuvant systemic therapy. We reviewed pathology and Oncotype Dx reports. Magda has hormone receptor positive IDC with small 5.2 mm tumor, G1, one SLN with micro met. Her RS is in the low risk range and predicts low likelihood benefit from adjuvant chemotherapy.  We discussed how to use RS to guide chemotherapy decision. The prospective RxPONDER trial showed that postmenopausal female with pT1-3, pN1 (1-3 positive nodes), HR positive, Her-2 negative , RS <26 derived no benefit from the addition of chemotherapy to endocrine therapy.  As per NCCN guideline, adjuvant endocrine therapy alone is recommended. \par \par We discussed adjuvant endocrine therapy with AI. Letrozole is recommended. We discussed the side effects of Letrozole which may include but not limit to muscular and skeletal aching, arthralgia, loss of bone density, osteopenia and osteoporosis, a small increase risk of cardiovascular events and slightly increase of hyperlipidemia. She understands and agrees to take the pill. A script for Letrozole was sent to her pharmacy. \par \par We discussed bone health management given Letrozole may reduce bone density. She is advised to take calcium and vitamin D supplement. Encourage health life style and regular physical activities. She is given a referral for bone density. Will give additional recommendation based on bone density report. \par \par She will see Dr. Weeks for consultation of adjuvant breast radiotherapy. \par \par All questions were answered appropriately. She agreed with the plan. \par \par RTO for followup in 3 months.

## 2023-05-19 NOTE — CONSULT LETTER
[Dear  ___] : Dear  [unfilled], [Consult Letter:] : I had the pleasure of evaluating your patient, [unfilled]. [Please see my note below.] : Please see my note below. [Consult Closing:] : Thank you very much for allowing me to participate in the care of this patient.  If you have any questions, please do not hesitate to contact me. [Sincerely,] : Sincerely, [DrCrissy  ___] : Dr. ROSS [FreeTextEntry3] : Shirin Espinoza MD

## 2023-05-22 ENCOUNTER — APPOINTMENT (OUTPATIENT)
Dept: SURGERY | Facility: CLINIC | Age: 68
End: 2023-05-22
Payer: MEDICARE

## 2023-05-22 VITALS
HEART RATE: 54 BPM | TEMPERATURE: 96.9 F | HEIGHT: 64 IN | OXYGEN SATURATION: 98 % | BODY MASS INDEX: 23.9 KG/M2 | DIASTOLIC BLOOD PRESSURE: 86 MMHG | WEIGHT: 140 LBS | SYSTOLIC BLOOD PRESSURE: 128 MMHG

## 2023-05-22 DIAGNOSIS — C50.911 MALIGNANT NEOPLASM OF UNSPECIFIED SITE OF RIGHT FEMALE BREAST: ICD-10-CM

## 2023-05-22 DIAGNOSIS — Z85.3 PERSONAL HISTORY OF MALIGNANT NEOPLASM OF BREAST: ICD-10-CM

## 2023-05-22 DIAGNOSIS — C50.311 MALIGNANT NEOPLASM OF LOWER-INNER QUADRANT OF RIGHT FEMALE BREAST: ICD-10-CM

## 2023-05-22 DIAGNOSIS — Z79.811 LONG TERM (CURRENT) USE OF AROMATASE INHIBITORS: ICD-10-CM

## 2023-05-22 DIAGNOSIS — Z17.0 PERSONAL HISTORY OF MALIGNANT NEOPLASM OF BREAST: ICD-10-CM

## 2023-05-22 PROCEDURE — 99024 POSTOP FOLLOW-UP VISIT: CPT

## 2023-05-22 NOTE — DATA REVIEWED
[FreeTextEntry1] :   Pathology reviewed in detail, along with medical and radiation oncology evaluations

## 2023-05-22 NOTE — ASSESSMENT
[FreeTextEntry1] :   The sinus tract at the lower end of the lumpectomy incision should heal now that the foreign body has been removed.  Local care instructions were given and she will return in 4 to 6 weeks for reevaluation.  She already has a small postsurgical deformity medial to the lumpectomy scar, which may be exacerbated after she has completed her RT.   She will complete the course of RT as prescribed and return here in 4 to 6 weeks for reevaluation, or sooner as needed.  All her questions were answered.

## 2023-05-22 NOTE — PHYSICAL EXAM
[de-identified] :  healthy [de-identified] :  both incisions are clean, with no evidence of hematoma or infection.  A small opening at the lower end of her lumpectomy site contains an extruding suture which was easily removed.  The wound was dressed with bacitracin.  There is an adjacent mild indentation due to postsurgical changes.

## 2023-05-22 NOTE — HISTORY OF PRESENT ILLNESS
[de-identified] : The patient comes for continued postoperative follow-up after her recent right breast cancer surgery.  She had a wide local excision and sentinel lymph node biopsy for a T1BN1i IDC with questionable LVI, HR positive and H ER 2 negative.   she is currently on letrozole via Dr. CHLOÉ VALLADARES without any adverse effects so far, after her Oncotype recurrence score returned at 10.  She has completed her RT simulation and will be starting whole breast RT in early June.

## 2023-05-25 ENCOUNTER — OUTPATIENT (OUTPATIENT)
Dept: OUTPATIENT SERVICES | Facility: HOSPITAL | Age: 68
LOS: 1 days | End: 2023-05-25
Payer: MEDICARE

## 2023-05-25 DIAGNOSIS — Z98.890 OTHER SPECIFIED POSTPROCEDURAL STATES: Chronic | ICD-10-CM

## 2023-05-25 PROCEDURE — 77295 3-D RADIOTHERAPY PLAN: CPT | Mod: 26

## 2023-05-25 PROCEDURE — 77300 RADIATION THERAPY DOSE PLAN: CPT | Mod: 26

## 2023-05-25 PROCEDURE — 77334 RADIATION TREATMENT AID(S): CPT | Mod: 26

## 2023-05-31 DIAGNOSIS — C50.311 MALIGNANT NEOPLASM OF LOWER-INNER QUADRANT OF RIGHT FEMALE BREAST: ICD-10-CM

## 2023-05-31 DIAGNOSIS — C50.911 MALIGNANT NEOPLASM OF UNSPECIFIED SITE OF RIGHT FEMALE BREAST: ICD-10-CM

## 2023-06-02 ENCOUNTER — OUTPATIENT (OUTPATIENT)
Dept: OUTPATIENT SERVICES | Facility: HOSPITAL | Age: 68
LOS: 1 days | End: 2023-06-02
Payer: MEDICARE

## 2023-06-02 DIAGNOSIS — Z98.890 OTHER SPECIFIED POSTPROCEDURAL STATES: Chronic | ICD-10-CM

## 2023-06-02 PROCEDURE — 77412 RADIATION TX DELIVERY LVL 3: CPT

## 2023-06-02 PROCEDURE — 77280 THER RAD SIMULAJ FIELD SMPL: CPT

## 2023-06-02 PROCEDURE — 77333 RADIATION TREATMENT AID(S): CPT

## 2023-06-02 PROCEDURE — 77333 RADIATION TREATMENT AID(S): CPT | Mod: 26

## 2023-06-02 PROCEDURE — 77387 GUIDANCE FOR RADJ TX DLVR: CPT

## 2023-06-02 PROCEDURE — G6017: CPT

## 2023-06-02 PROCEDURE — 77280 THER RAD SIMULAJ FIELD SMPL: CPT | Mod: 26

## 2023-06-03 ENCOUNTER — OUTPATIENT (OUTPATIENT)
Dept: OUTPATIENT SERVICES | Facility: HOSPITAL | Age: 68
LOS: 1 days | End: 2023-06-03
Payer: MEDICARE

## 2023-06-03 DIAGNOSIS — Z98.890 OTHER SPECIFIED POSTPROCEDURAL STATES: Chronic | ICD-10-CM

## 2023-06-03 PROCEDURE — G6017: CPT

## 2023-06-05 ENCOUNTER — NON-APPOINTMENT (OUTPATIENT)
Age: 68
End: 2023-06-05

## 2023-06-05 ENCOUNTER — OUTPATIENT (OUTPATIENT)
Dept: OUTPATIENT SERVICES | Facility: HOSPITAL | Age: 68
LOS: 1 days | End: 2023-06-05
Payer: MEDICARE

## 2023-06-05 VITALS
OXYGEN SATURATION: 100 % | WEIGHT: 146.38 LBS | TEMPERATURE: 97.7 F | BODY MASS INDEX: 25.13 KG/M2 | SYSTOLIC BLOOD PRESSURE: 130 MMHG | HEART RATE: 69 BPM | RESPIRATION RATE: 16 BRPM | DIASTOLIC BLOOD PRESSURE: 72 MMHG

## 2023-06-05 DIAGNOSIS — C50.311 MALIGNANT NEOPLASM OF LOWER-INNER QUADRANT OF RIGHT FEMALE BREAST: ICD-10-CM

## 2023-06-05 DIAGNOSIS — C50.911 MALIGNANT NEOPLASM OF UNSPECIFIED SITE OF RIGHT FEMALE BREAST: ICD-10-CM

## 2023-06-05 DIAGNOSIS — Z98.890 OTHER SPECIFIED POSTPROCEDURAL STATES: Chronic | ICD-10-CM

## 2023-06-05 PROCEDURE — G6017: CPT

## 2023-06-05 RX ORDER — UBIDECARENONE/VIT E ACET 100MG-5
CAPSULE ORAL
Refills: 0 | Status: DISCONTINUED | COMMUNITY
End: 2023-06-05

## 2023-06-05 NOTE — DISEASE MANAGEMENT
[Pathological] : TNM Stage: p [IA] : IA [FreeTextEntry4] : invasive ductal carcinoma of the right breast, G1, ER/AR positive / HER 2 negative [TTNM] : 1b [NTNM] : 1mi [MTNM] : 0 [de-identified] : 936fGr [de-identified] : 5240cGy [de-identified] : right breast

## 2023-06-05 NOTE — PHYSICAL EXAM
[Sclera] : the sclera and conjunctiva were normal [] : no respiratory distress [Exaggerated Use Of Accessory Muscles For Inspiration] : no accessory muscle use [Normal] : oriented to person, place and time, the affect was normal, the mood was normal and not anxious [de-identified] : No erythema in radiation field.  No desquamation.

## 2023-06-05 NOTE — HISTORY OF PRESENT ILLNESS
[FreeTextEntry1] : Patient reports doing well. She denies pain or discomfort. Reviewed treatment related side effects and skin care. She is moisturizing with Aquaphor after treatment. No complaints at this time.

## 2023-06-06 ENCOUNTER — OUTPATIENT (OUTPATIENT)
Dept: OUTPATIENT SERVICES | Facility: HOSPITAL | Age: 68
LOS: 1 days | End: 2023-06-06

## 2023-06-06 DIAGNOSIS — Z98.890 OTHER SPECIFIED POSTPROCEDURAL STATES: Chronic | ICD-10-CM

## 2023-06-06 DIAGNOSIS — C50.311 MALIGNANT NEOPLASM OF LOWER-INNER QUADRANT OF RIGHT FEMALE BREAST: ICD-10-CM

## 2023-06-06 DIAGNOSIS — C50.911 MALIGNANT NEOPLASM OF UNSPECIFIED SITE OF RIGHT FEMALE BREAST: ICD-10-CM

## 2023-06-06 PROCEDURE — G6017: CPT

## 2023-06-07 ENCOUNTER — OUTPATIENT (OUTPATIENT)
Dept: OUTPATIENT SERVICES | Facility: HOSPITAL | Age: 68
LOS: 1 days | End: 2023-06-07
Payer: MEDICARE

## 2023-06-07 DIAGNOSIS — Z98.890 OTHER SPECIFIED POSTPROCEDURAL STATES: Chronic | ICD-10-CM

## 2023-06-07 DIAGNOSIS — C50.311 MALIGNANT NEOPLASM OF LOWER-INNER QUADRANT OF RIGHT FEMALE BREAST: ICD-10-CM

## 2023-06-07 DIAGNOSIS — C50.911 MALIGNANT NEOPLASM OF UNSPECIFIED SITE OF RIGHT FEMALE BREAST: ICD-10-CM

## 2023-06-07 PROCEDURE — 77427 RADIATION TX MANAGEMENT X5: CPT

## 2023-06-08 ENCOUNTER — OUTPATIENT (OUTPATIENT)
Dept: OUTPATIENT SERVICES | Facility: HOSPITAL | Age: 68
LOS: 1 days | End: 2023-06-08
Payer: MEDICARE

## 2023-06-08 DIAGNOSIS — C50.311 MALIGNANT NEOPLASM OF LOWER-INNER QUADRANT OF RIGHT FEMALE BREAST: ICD-10-CM

## 2023-06-08 DIAGNOSIS — C50.911 MALIGNANT NEOPLASM OF UNSPECIFIED SITE OF RIGHT FEMALE BREAST: ICD-10-CM

## 2023-06-08 DIAGNOSIS — Z98.890 OTHER SPECIFIED POSTPROCEDURAL STATES: Chronic | ICD-10-CM

## 2023-06-08 PROCEDURE — G6017: CPT

## 2023-06-09 ENCOUNTER — OUTPATIENT (OUTPATIENT)
Dept: OUTPATIENT SERVICES | Facility: HOSPITAL | Age: 68
LOS: 1 days | End: 2023-06-09
Payer: MEDICARE

## 2023-06-09 DIAGNOSIS — Z98.890 OTHER SPECIFIED POSTPROCEDURAL STATES: Chronic | ICD-10-CM

## 2023-06-09 PROCEDURE — G6017: CPT

## 2023-06-12 ENCOUNTER — OUTPATIENT (OUTPATIENT)
Dept: OUTPATIENT SERVICES | Facility: HOSPITAL | Age: 68
LOS: 1 days | End: 2023-06-12

## 2023-06-12 ENCOUNTER — NON-APPOINTMENT (OUTPATIENT)
Age: 68
End: 2023-06-12

## 2023-06-12 VITALS
BODY MASS INDEX: 24.57 KG/M2 | RESPIRATION RATE: 16 BRPM | WEIGHT: 143.13 LBS | HEART RATE: 70 BPM | DIASTOLIC BLOOD PRESSURE: 60 MMHG | TEMPERATURE: 97.3 F | OXYGEN SATURATION: 99 % | SYSTOLIC BLOOD PRESSURE: 103 MMHG

## 2023-06-12 DIAGNOSIS — Z98.890 OTHER SPECIFIED POSTPROCEDURAL STATES: Chronic | ICD-10-CM

## 2023-06-12 DIAGNOSIS — C50.911 MALIGNANT NEOPLASM OF UNSPECIFIED SITE OF RIGHT FEMALE BREAST: ICD-10-CM

## 2023-06-12 DIAGNOSIS — C50.311 MALIGNANT NEOPLASM OF LOWER-INNER QUADRANT OF RIGHT FEMALE BREAST: ICD-10-CM

## 2023-06-12 PROCEDURE — G6017: CPT

## 2023-06-12 NOTE — PHYSICAL EXAM
[Sclera] : the sclera and conjunctiva were normal [Hearing Threshold Finger Rub Not [place]] : hearing was normal [] : no respiratory distress [Respiration, Rhythm And Depth] : normal respiratory rhythm and effort [Exaggerated Use Of Accessory Muscles For Inspiration] : no accessory muscle use [Heart Rate And Rhythm] : heart rate and rhythm were normal [No Discharge] : no discharge [___] : a [unfilled] ~Ucm area of erythema [Nail Clubbing] : no clubbing  or cyanosis of the fingernails [No Focal Deficits] : no focal deficits [Motor Exam] : the motor exam was normal [Normal] : oriented to person, place and time, the affect was normal, the mood was normal and not anxious

## 2023-06-12 NOTE — HISTORY OF PRESENT ILLNESS
[FreeTextEntry1] : Patient doing well, mild erythema, applies Aquaphor, no breast pan, No new concerns.

## 2023-06-12 NOTE — DISEASE MANAGEMENT
[Pathological] : TNM Stage: p [IA] : IA [FreeTextEntry4] : invasive ductal carcinoma of the right breast, G1, ER/[place] positive / HER 2 negative [TTNM] : 1b [NTNM] : 1mi [MTNM] : 0 [de-identified] : 0357cGy [de-identified] : 5240cGy [de-identified] : right breast

## 2023-06-13 ENCOUNTER — OUTPATIENT (OUTPATIENT)
Dept: OUTPATIENT SERVICES | Facility: HOSPITAL | Age: 68
LOS: 1 days | End: 2023-06-13
Payer: MEDICARE

## 2023-06-13 DIAGNOSIS — Z98.890 OTHER SPECIFIED POSTPROCEDURAL STATES: Chronic | ICD-10-CM

## 2023-06-13 PROCEDURE — G6017: CPT

## 2023-06-14 ENCOUNTER — OUTPATIENT (OUTPATIENT)
Dept: OUTPATIENT SERVICES | Facility: HOSPITAL | Age: 68
LOS: 1 days | End: 2023-06-14

## 2023-06-14 DIAGNOSIS — C50.311 MALIGNANT NEOPLASM OF LOWER-INNER QUADRANT OF RIGHT FEMALE BREAST: ICD-10-CM

## 2023-06-14 DIAGNOSIS — C50.911 MALIGNANT NEOPLASM OF UNSPECIFIED SITE OF RIGHT FEMALE BREAST: ICD-10-CM

## 2023-06-14 DIAGNOSIS — Z98.890 OTHER SPECIFIED POSTPROCEDURAL STATES: Chronic | ICD-10-CM

## 2023-06-14 PROCEDURE — 77427 RADIATION TX MANAGEMENT X5: CPT

## 2023-06-15 ENCOUNTER — OUTPATIENT (OUTPATIENT)
Dept: OUTPATIENT SERVICES | Facility: HOSPITAL | Age: 68
LOS: 1 days | End: 2023-06-15
Payer: MEDICARE

## 2023-06-15 DIAGNOSIS — Z98.890 OTHER SPECIFIED POSTPROCEDURAL STATES: Chronic | ICD-10-CM

## 2023-06-15 PROCEDURE — G6017: CPT

## 2023-06-16 ENCOUNTER — OUTPATIENT (OUTPATIENT)
Dept: OUTPATIENT SERVICES | Facility: HOSPITAL | Age: 68
LOS: 1 days | End: 2023-06-16
Payer: MEDICARE

## 2023-06-16 DIAGNOSIS — Z98.890 OTHER SPECIFIED POSTPROCEDURAL STATES: Chronic | ICD-10-CM

## 2023-06-16 DIAGNOSIS — C50.911 MALIGNANT NEOPLASM OF UNSPECIFIED SITE OF RIGHT FEMALE BREAST: ICD-10-CM

## 2023-06-16 DIAGNOSIS — C50.311 MALIGNANT NEOPLASM OF LOWER-INNER QUADRANT OF RIGHT FEMALE BREAST: ICD-10-CM

## 2023-06-16 PROCEDURE — G6017: CPT

## 2023-06-20 ENCOUNTER — NON-APPOINTMENT (OUTPATIENT)
Age: 68
End: 2023-06-20

## 2023-06-20 ENCOUNTER — OUTPATIENT (OUTPATIENT)
Dept: OUTPATIENT SERVICES | Facility: HOSPITAL | Age: 68
LOS: 1 days | End: 2023-06-20
Payer: MEDICARE

## 2023-06-20 VITALS
TEMPERATURE: 97.7 F | RESPIRATION RATE: 16 BRPM | OXYGEN SATURATION: 99 % | BODY MASS INDEX: 24.76 KG/M2 | HEART RATE: 66 BPM | SYSTOLIC BLOOD PRESSURE: 141 MMHG | DIASTOLIC BLOOD PRESSURE: 66 MMHG | WEIGHT: 144.25 LBS

## 2023-06-20 DIAGNOSIS — Z98.890 OTHER SPECIFIED POSTPROCEDURAL STATES: Chronic | ICD-10-CM

## 2023-06-20 DIAGNOSIS — C50.311 MALIGNANT NEOPLASM OF LOWER-INNER QUADRANT OF RIGHT FEMALE BREAST: ICD-10-CM

## 2023-06-20 DIAGNOSIS — C50.911 MALIGNANT NEOPLASM OF UNSPECIFIED SITE OF RIGHT FEMALE BREAST: ICD-10-CM

## 2023-06-20 PROCEDURE — G6017: CPT

## 2023-06-21 ENCOUNTER — OUTPATIENT (OUTPATIENT)
Dept: OUTPATIENT SERVICES | Facility: HOSPITAL | Age: 68
LOS: 1 days | End: 2023-06-21

## 2023-06-21 DIAGNOSIS — C50.311 MALIGNANT NEOPLASM OF LOWER-INNER QUADRANT OF RIGHT FEMALE BREAST: ICD-10-CM

## 2023-06-21 DIAGNOSIS — Z98.890 OTHER SPECIFIED POSTPROCEDURAL STATES: Chronic | ICD-10-CM

## 2023-06-21 DIAGNOSIS — C50.911 MALIGNANT NEOPLASM OF UNSPECIFIED SITE OF RIGHT FEMALE BREAST: ICD-10-CM

## 2023-06-21 PROCEDURE — G6017: CPT

## 2023-06-21 NOTE — PHYSICAL EXAM
[Sclera] : the sclera and conjunctiva were normal [Hearing Threshold Finger Rub Not [place]] : hearing was normal [] : no respiratory distress [Respiration, Rhythm And Depth] : normal respiratory rhythm and effort [Exaggerated Use Of Accessory Muscles For Inspiration] : no accessory muscle use [Heart Rate And Rhythm] : heart rate and rhythm were normal [No Discharge] : no discharge [___] : a [unfilled] ~Ucm mastectomy scar [Nail Clubbing] : no clubbing  or cyanosis of the fingernails [No Focal Deficits] : no focal deficits [Motor Exam] : the motor exam was normal [Normal] : oriented to person, place and time, the affect was normal, the mood was normal and not anxious [Enlargement Of The Right Breast] : no swelling [Tenderness Of The Right Breast] : no tenderness

## 2023-06-21 NOTE — HISTORY OF PRESENT ILLNESS
[FreeTextEntry1] : Patient reports doing well.  She denies breast pain, pruritus and fatigue.  She is applying Aquaphor 2X/day.  She has no new concerns.  
Stable.

## 2023-06-21 NOTE — DISEASE MANAGEMENT
[Pathological] : TNM Stage: p [IA] : IA [FreeTextEntry4] : invasive ductal carcinoma of the right breast, G1, ER/HI positive / HER 2 negative [TTNM] : 1b [NTNM] : 1mi [MTNM] : 0 [de-identified] : [id number] [de-identified] : 5240cGy [de-identified] : right breast

## 2023-06-22 ENCOUNTER — OUTPATIENT (OUTPATIENT)
Dept: OUTPATIENT SERVICES | Facility: HOSPITAL | Age: 68
LOS: 1 days | End: 2023-06-22
Payer: MEDICARE

## 2023-06-22 DIAGNOSIS — Z98.890 OTHER SPECIFIED POSTPROCEDURAL STATES: Chronic | ICD-10-CM

## 2023-06-22 PROCEDURE — G6017: CPT

## 2023-06-22 PROCEDURE — 77427 RADIATION TX MANAGEMENT X5: CPT

## 2023-06-23 ENCOUNTER — OUTPATIENT (OUTPATIENT)
Dept: OUTPATIENT SERVICES | Facility: HOSPITAL | Age: 68
LOS: 1 days | End: 2023-06-23

## 2023-06-23 DIAGNOSIS — Z98.890 OTHER SPECIFIED POSTPROCEDURAL STATES: Chronic | ICD-10-CM

## 2023-06-23 DIAGNOSIS — C50.311 MALIGNANT NEOPLASM OF LOWER-INNER QUADRANT OF RIGHT FEMALE BREAST: ICD-10-CM

## 2023-06-23 DIAGNOSIS — C50.911 MALIGNANT NEOPLASM OF UNSPECIFIED SITE OF RIGHT FEMALE BREAST: ICD-10-CM

## 2023-06-23 PROCEDURE — 77280 THER RAD SIMULAJ FIELD SMPL: CPT | Mod: 26

## 2023-06-23 PROCEDURE — G6017: CPT

## 2023-06-23 PROCEDURE — 77333 RADIATION TREATMENT AID(S): CPT | Mod: 26

## 2023-06-26 ENCOUNTER — NON-APPOINTMENT (OUTPATIENT)
Age: 68
End: 2023-06-26

## 2023-06-26 ENCOUNTER — OUTPATIENT (OUTPATIENT)
Dept: OUTPATIENT SERVICES | Facility: HOSPITAL | Age: 68
LOS: 1 days | End: 2023-06-26

## 2023-06-26 VITALS
BODY MASS INDEX: 24.8 KG/M2 | HEART RATE: 60 BPM | TEMPERATURE: 97.5 F | DIASTOLIC BLOOD PRESSURE: 59 MMHG | SYSTOLIC BLOOD PRESSURE: 128 MMHG | WEIGHT: 144.5 LBS | RESPIRATION RATE: 18 BRPM | OXYGEN SATURATION: 99 %

## 2023-06-26 DIAGNOSIS — Z98.890 OTHER SPECIFIED POSTPROCEDURAL STATES: Chronic | ICD-10-CM

## 2023-06-26 DIAGNOSIS — C50.311 MALIGNANT NEOPLASM OF LOWER-INNER QUADRANT OF RIGHT FEMALE BREAST: ICD-10-CM

## 2023-06-26 DIAGNOSIS — C50.911 MALIGNANT NEOPLASM OF UNSPECIFIED SITE OF RIGHT FEMALE BREAST: ICD-10-CM

## 2023-06-26 PROCEDURE — G6017: CPT

## 2023-06-26 NOTE — PHYSICAL EXAM
[Sclera] : the sclera and conjunctiva were normal [] : no respiratory distress [Respiration, Rhythm And Depth] : normal respiratory rhythm and effort [Exaggerated Use Of Accessory Muscles For Inspiration] : no accessory muscle use [Heart Rate And Rhythm] : heart rate and rhythm were normal [___] : a [unfilled] ~Ucm mastectomy scar [Nail Clubbing] : no clubbing  or cyanosis of the fingernails [Motor Exam] : the motor exam was normal [Normal] : oriented to person, place and time, the affect was normal, the mood was normal and not anxious [Enlargement Of The Right Breast] : no swelling [Tenderness Of The Right Breast] : no tenderness

## 2023-06-26 NOTE — HISTORY OF PRESENT ILLNESS
[FreeTextEntry1] : Patient reports doing well.  She denies breast pain, no fatigue, and has no new concerns.  She will be completing XRT this week.  We discussed d/c & f/u instructions.  She continues to take her Letrozole while on XRT.

## 2023-06-26 NOTE — DISEASE MANAGEMENT
[Pathological] : TNM Stage: p [IA] : IA [FreeTextEntry4] : invasive ductal carcinoma of the right breast, G1, ER/[place] positive / HER 2 negative [TTNM] : 1b [NTNM] : 1mi [MTNM] : 0 [de-identified] : 9570cGy [de-identified] : 5240cGy [de-identified] : right breast

## 2023-06-26 NOTE — REVIEW OF SYSTEMS
[Fatigue: Grade 0] : Fatigue: Grade 0 [Breast Pain: Grade 0] : Breast Pain: Grade 0 [Pruritus: Grade 0] : Pruritus: Grade 0 [Dermatitis Radiation: Grade 2 - Moderate to brisk erythema; patchy moist desquamation, mostly confined to skin folds and creases; moderate edema] : Dermatitis Radiation: Grade 2 - Moderate to brisk erythema; patchy moist desquamation, mostly confined to skin folds and creases; moderate edema [FreeTextEntry6] : no desquamation

## 2023-06-27 ENCOUNTER — OUTPATIENT (OUTPATIENT)
Dept: OUTPATIENT SERVICES | Facility: HOSPITAL | Age: 68
LOS: 1 days | End: 2023-06-27

## 2023-06-27 DIAGNOSIS — Z98.890 OTHER SPECIFIED POSTPROCEDURAL STATES: Chronic | ICD-10-CM

## 2023-06-27 PROCEDURE — G6017: CPT

## 2023-06-28 ENCOUNTER — OUTPATIENT (OUTPATIENT)
Dept: OUTPATIENT SERVICES | Facility: HOSPITAL | Age: 68
LOS: 1 days | End: 2023-06-28
Payer: MEDICARE

## 2023-06-28 DIAGNOSIS — Z98.890 OTHER SPECIFIED POSTPROCEDURAL STATES: Chronic | ICD-10-CM

## 2023-06-28 PROCEDURE — G6017: CPT

## 2023-06-29 ENCOUNTER — OUTPATIENT (OUTPATIENT)
Dept: OUTPATIENT SERVICES | Facility: HOSPITAL | Age: 68
LOS: 1 days | End: 2023-06-29

## 2023-06-29 DIAGNOSIS — C50.311 MALIGNANT NEOPLASM OF LOWER-INNER QUADRANT OF RIGHT FEMALE BREAST: ICD-10-CM

## 2023-06-29 DIAGNOSIS — Z98.890 OTHER SPECIFIED POSTPROCEDURAL STATES: Chronic | ICD-10-CM

## 2023-06-29 DIAGNOSIS — C50.911 MALIGNANT NEOPLASM OF UNSPECIFIED SITE OF RIGHT FEMALE BREAST: ICD-10-CM

## 2023-06-29 PROCEDURE — 77427 RADIATION TX MANAGEMENT X5: CPT

## 2023-06-29 PROCEDURE — G6017: CPT

## 2023-06-30 DIAGNOSIS — C50.911 MALIGNANT NEOPLASM OF UNSPECIFIED SITE OF RIGHT FEMALE BREAST: ICD-10-CM

## 2023-06-30 DIAGNOSIS — C50.311 MALIGNANT NEOPLASM OF LOWER-INNER QUADRANT OF RIGHT FEMALE BREAST: ICD-10-CM

## 2023-07-03 ENCOUNTER — APPOINTMENT (OUTPATIENT)
Dept: SURGERY | Facility: CLINIC | Age: 68
End: 2023-07-03
Payer: MEDICARE

## 2023-07-03 PROCEDURE — 99213 OFFICE O/P EST LOW 20 MIN: CPT | Mod: 24

## 2023-07-03 NOTE — HISTORY OF PRESENT ILLNESS
[de-identified] : The patient returns for continued breast cancer surveillance.  She finished her radiation therapy via Dr. Waller due last week with minimal local reaction.\par \par Right breast WLE/sentinel node biopsy April 2023, T1bN0, ER positive/CO positive/H ER 2 negative with questionable LVI and Oncotype recurrence score of 10.  She is on letrozole per Dr. CHLOÉ VALLADARES and is tolerating that well.

## 2023-07-03 NOTE — ASSESSMENT
[FreeTextEntry1] :   Excellent cosmetic result so far with no breast shrinkage or deformity, but which may develop as she heals from the radiation.  A new baseline right mammogram will be done within the next 2 months and she will return here in about 3 months for reexam, or sooner as needed all questions were answered and she is happy with the assessment and plan.  She will continue to follow-up with medical and radiation oncology as scheduled.

## 2023-07-03 NOTE — PHYSICAL EXAM
[de-identified] :  healthy and cheerful [de-identified] :  no adenopathy [de-identified] :  symmetrical and free of any nipple discharge, nipple retraction, suspicious skin changes bilaterally.  Minimal erythema of the right breast skin, without desquamation or other acute reaction.  Minimal residual thickening at the WLE site.  Incisions continue to heal very nicely.  No new masses or suspicious areas noted in either breast, no deformity noted at the WLE site.  No axillary adenopathy bilaterally.

## 2023-08-08 ENCOUNTER — OUTPATIENT (OUTPATIENT)
Dept: OUTPATIENT SERVICES | Facility: HOSPITAL | Age: 68
LOS: 1 days | End: 2023-08-08
Payer: MEDICARE

## 2023-08-08 ENCOUNTER — APPOINTMENT (OUTPATIENT)
Dept: RADIATION ONCOLOGY | Facility: HOSPITAL | Age: 68
End: 2023-08-08
Payer: MEDICARE

## 2023-08-08 VITALS
RESPIRATION RATE: 16 BRPM | HEART RATE: 72 BPM | SYSTOLIC BLOOD PRESSURE: 132 MMHG | WEIGHT: 143 LBS | TEMPERATURE: 97.6 F | DIASTOLIC BLOOD PRESSURE: 63 MMHG | BODY MASS INDEX: 24.55 KG/M2 | OXYGEN SATURATION: 99 %

## 2023-08-08 DIAGNOSIS — C50.311 MALIGNANT NEOPLASM OF LOWER-INNER QUADRANT OF RIGHT FEMALE BREAST: ICD-10-CM

## 2023-08-08 DIAGNOSIS — Z98.890 OTHER SPECIFIED POSTPROCEDURAL STATES: Chronic | ICD-10-CM

## 2023-08-08 PROCEDURE — 99024 POSTOP FOLLOW-UP VISIT: CPT

## 2023-08-08 RX ORDER — MAGNESIUM OXIDE/MAG AA CHELATE 300 MG
CAPSULE ORAL
Refills: 0 | Status: DISCONTINUED | COMMUNITY
End: 2023-08-08

## 2023-08-10 NOTE — DISEASE MANAGEMENT
[Pathological] : TNM Stage: p [IA] : IA [de-identified] : 5240cGy [de-identified] : 5240cGy [FreeTextEntry4] : invasive ductal carcinoma of the right breast, G1, ER/GA positive / HER 2 negative [TTNM] : 1b [NTNM] : 1mi [MTNM] : 0 [de-identified] : right breast

## 2023-08-10 NOTE — HISTORY OF PRESENT ILLNESS
[FreeTextEntry1] : GOOD LOPEZ returns to clinic in follow up visit.  As you know, the patient is a 67 year old female with invasive ductal carcinoma of the right breast, G1, ER/MT positive / HER 2 negative, [id number], Stage IA. She is s/p breast conserving surgery.  She received 5240cGy to the right breast 6/3/2023 to 6/29/2023. The patient tolerated treatments quite well. The patient had the expected side effects of fatigue and skin erythema. In the interim, she feels great, tolerating Letrozole 2.5 mg well, denies any joint discomfort or pain. She just came back from [place].  Next Mammogram 8/14/2023  Upcoming appointments: Dr. Jerome 10/2/2023 Dr. Espinoza 8/14/2023

## 2023-08-10 NOTE — LETTER CLOSING
[Consult Closing:] : Thank you for allowing me to participate in the care of this patient.  If you have any questions, please do not hesitate to contact me. [Sincerely yours,] : Sincerely yours, [FreeTextEntry3] : Estrella Weeks M.D. \par  \par  Electronically proofread and signed by:  Estrella Weeks MD\par  Attending, Department of Radiation Medicine\par  [facility]\par  \par  CC: Dr. Espinoza

## 2023-08-10 NOTE — PHYSICAL EXAM
[Sclera] : the sclera and conjunctiva were normal [Heart Rate And Rhythm] : heart rate and rhythm were normal [Heart Sounds] : normal S1 and S2 [Nondistended] : nondistended [Cervical Lymph Nodes Enlarged Posterior Bilaterally] : posterior cervical [Cervical Lymph Nodes Enlarged Anterior Bilaterally] : anterior cervical [Supraclavicular Lymph Nodes Enlarged Bilaterally] : supraclavicular [Axillary Lymph Nodes Enlarged Bilaterally] : axillary [Normal] : oriented to person, place and time, the affect was normal, the mood was normal and not anxious [de-identified] : She is examined in both the upright and supine positions. Minimal residual hyperpigmentation on the right breast.  No palpable mass in either breast.

## 2023-08-14 ENCOUNTER — OUTPATIENT (OUTPATIENT)
Dept: OUTPATIENT SERVICES | Facility: HOSPITAL | Age: 68
LOS: 1 days | End: 2023-08-14
Payer: MEDICARE

## 2023-08-14 ENCOUNTER — APPOINTMENT (OUTPATIENT)
Dept: HEMATOLOGY ONCOLOGY | Facility: CLINIC | Age: 68
End: 2023-08-14
Payer: MEDICARE

## 2023-08-14 VITALS
HEIGHT: 64 IN | WEIGHT: 143 LBS | BODY MASS INDEX: 24.41 KG/M2 | SYSTOLIC BLOOD PRESSURE: 138 MMHG | RESPIRATION RATE: 18 BRPM | DIASTOLIC BLOOD PRESSURE: 74 MMHG | HEART RATE: 62 BPM

## 2023-08-14 DIAGNOSIS — Z98.890 OTHER SPECIFIED POSTPROCEDURAL STATES: Chronic | ICD-10-CM

## 2023-08-14 DIAGNOSIS — C50.911 MALIGNANT NEOPLASM OF UNSPECIFIED SITE OF RIGHT FEMALE BREAST: ICD-10-CM

## 2023-08-14 DIAGNOSIS — Z79.811 LONG TERM (CURRENT) USE OF AROMATASE INHIBITORS: ICD-10-CM

## 2023-08-14 PROCEDURE — 99214 OFFICE O/P EST MOD 30 MIN: CPT

## 2023-08-14 NOTE — ASSESSMENT
[FreeTextEntry1] : 68 yo female has state IB ([id number]) IDC of the right breast right breast, G1, ER/SD positive and Her-2 negative, s/p right breast lumpectomy and right axillary SLNB with negative margins.  Oncotype RS is 10, in low risk range.   Assessment and Plan: -- Continue Letrozole daily. -- Breast exam today did not reveal palpable abnormality. She is schedule for dx and US right breast in 8/2023.  -- Mild osteopenia. Bone density report reviewed. Continue calcium and vitamin D supplement and regular exercise.  -- Followup with Dr. Jerome and Dr. Weeks as scheduled.   RTO for followup in 3 months.

## 2023-08-14 NOTE — CONSULT LETTER
[Dear  ___] : Dear  [unfilled], [Please see my note below.] : Please see my note below. [Sincerely,] : Sincerely, [DrCrissy  ___] : Dr. ROSS [Courtesy Letter:] : I had the pleasure of seeing your patient, [unfilled], in my office today. [FreeTextEntry3] : Shirin Espinoza MD

## 2023-08-14 NOTE — HISTORY OF PRESENT ILLNESS
[T: ___] : T[unfilled] [N: ___] : N[unfilled] [M: ___] : M[unfilled] [AJCC Stage: ____] : AJCC Stage: [unfilled] [de-identified] : 67 F w/ PMH of HTN and HLD was recently diagnosed T1b N1i M0 IDC right breast with (ER 95%/MS 90%/HER2 IHC 0), grade 1 and she is referred by Dr. Jerome for consultation of adjuvant systemic therapy.  History goes back to 1/10/23 when she had a routine screening mammogram which showed a new right breast focal asymmetry in the lower inner R breast, anterior depth. She subsequently underwent R breast biopsy on 23. Biopsy showed invasive well to moderately differentiated ductal carcinoma with focal lobular features and signet ring cell formation, ER+ (95%), MS+ (90%) and HER2- (0 by IHC). She was referrred to Dr. Jerome and had MRI breast that showed biopsy-proven carcinoma in the right breast and no MRI evidence of malignancy in the left breast.   On 23, she underwent right LIQ mass, radiofrequency seed localized lumpectomy and right axillary SLNB. The pathology revealed 5.2 mm invasive well differentiated ductal carcinoma  with focal lobular features including signet ring cell formation. Non-extensive ductal carcinoma in situ (DCIS), solid type, intermediate nuclear grade. A focus suspicious for lymphovascular invasion is seen. All margins are negative for malignancy. One SLN is positive micro-metastatic carcinoma with the largest contiguous focus of which measures 0.47 mm. No extracapsular extension is identified. AJCC 8th Edition Pathologic Stage: pT1b, p(sn)N1mi, pMx. Oncotype RS is 10.   She has recovered well from surgery and is here today with her sister to discuss systemic adjuvant therapy.   PMH: HTN, HLD and hypothyroidism PSH: none Gyn hx: Menarche age 11, menopause age 54, , no breast feeding, no breast bx, no hx of OCP or HRT use Social: Social alcohol use, denies tobacco use Family hx: no family hx of cancer. [de-identified] : 8/14/23 GOOD is here for followup visit for state IB ([id number]) IDC of the right breast right breast, G1, ER/HI positive and Her-2 negative, s/p right breast lumpectomy and right axillary SLNB with negative margins. Oncotype RS is 10, in low risk range. She completed adjuvant breast radiotherapy. She has been on Letrozole since 5/2023 and tolerated well. She is feeling well.

## 2023-08-14 NOTE — PHYSICAL EXAM
[Fully active, able to carry on all pre-disease performance without restriction] : Status 0 - Fully active, able to carry on all pre-disease performance without restriction [Normal] : affect appropriate [de-identified] : S/P right breast lumpectomy and eight axillary SLNB. Surgical incisions are healing well. There is no palpable abnormality.

## 2023-08-23 ENCOUNTER — OUTPATIENT (OUTPATIENT)
Dept: OUTPATIENT SERVICES | Facility: HOSPITAL | Age: 68
LOS: 1 days | End: 2023-08-23
Payer: MEDICARE

## 2023-08-23 ENCOUNTER — RESULT REVIEW (OUTPATIENT)
Age: 68
End: 2023-08-23

## 2023-08-23 DIAGNOSIS — R92.8 OTHER ABNORMAL AND INCONCLUSIVE FINDINGS ON DIAGNOSTIC IMAGING OF BREAST: ICD-10-CM

## 2023-08-23 DIAGNOSIS — Z98.890 OTHER SPECIFIED POSTPROCEDURAL STATES: Chronic | ICD-10-CM

## 2023-08-23 DIAGNOSIS — R92.2 INCONCLUSIVE MAMMOGRAM: ICD-10-CM

## 2023-08-23 PROCEDURE — G0279: CPT | Mod: 26

## 2023-08-23 PROCEDURE — 77065 DX MAMMO INCL CAD UNI: CPT | Mod: 26,RT

## 2023-08-23 PROCEDURE — 77065 DX MAMMO INCL CAD UNI: CPT | Mod: RT

## 2023-08-23 PROCEDURE — G0279: CPT

## 2023-08-24 DIAGNOSIS — R92.8 OTHER ABNORMAL AND INCONCLUSIVE FINDINGS ON DIAGNOSTIC IMAGING OF BREAST: ICD-10-CM

## 2023-10-02 ENCOUNTER — APPOINTMENT (OUTPATIENT)
Dept: SURGERY | Facility: CLINIC | Age: 68
End: 2023-10-02
Payer: MEDICARE

## 2023-10-02 VITALS
BODY MASS INDEX: 24.41 KG/M2 | HEIGHT: 64 IN | DIASTOLIC BLOOD PRESSURE: 78 MMHG | SYSTOLIC BLOOD PRESSURE: 126 MMHG | WEIGHT: 143 LBS | OXYGEN SATURATION: 97 % | TEMPERATURE: 97 F | HEART RATE: 65 BPM

## 2023-10-02 DIAGNOSIS — Z98.890 OTHER SPECIFIED POSTPROCEDURAL STATES: ICD-10-CM

## 2023-10-02 PROCEDURE — 99212 OFFICE O/P EST SF 10 MIN: CPT

## 2023-10-03 PROBLEM — Z98.890 S/P LUMPECTOMY, RIGHT BREAST: Status: ACTIVE | Noted: 2023-05-16

## 2024-01-08 ENCOUNTER — APPOINTMENT (OUTPATIENT)
Dept: SURGERY | Facility: CLINIC | Age: 69
End: 2024-01-08
Payer: MEDICARE

## 2024-01-08 VITALS
HEIGHT: 64 IN | OXYGEN SATURATION: 98 % | WEIGHT: 143 LBS | DIASTOLIC BLOOD PRESSURE: 78 MMHG | SYSTOLIC BLOOD PRESSURE: 116 MMHG | BODY MASS INDEX: 24.41 KG/M2 | TEMPERATURE: 97 F | HEART RATE: 78 BPM

## 2024-01-08 PROCEDURE — 99212 OFFICE O/P EST SF 10 MIN: CPT

## 2024-01-10 ENCOUNTER — RESULT REVIEW (OUTPATIENT)
Age: 69
End: 2024-01-10

## 2024-01-10 ENCOUNTER — OUTPATIENT (OUTPATIENT)
Dept: OUTPATIENT SERVICES | Facility: HOSPITAL | Age: 69
LOS: 1 days | End: 2024-01-10
Payer: MEDICARE

## 2024-01-10 DIAGNOSIS — R92.8 OTHER ABNORMAL AND INCONCLUSIVE FINDINGS ON DIAGNOSTIC IMAGING OF BREAST: ICD-10-CM

## 2024-01-10 DIAGNOSIS — Z98.890 OTHER SPECIFIED POSTPROCEDURAL STATES: Chronic | ICD-10-CM

## 2024-01-10 PROCEDURE — 77066 DX MAMMO INCL CAD BI: CPT

## 2024-01-10 PROCEDURE — 77066 DX MAMMO INCL CAD BI: CPT | Mod: 26

## 2024-01-10 PROCEDURE — G0279: CPT | Mod: 26

## 2024-01-10 PROCEDURE — G0279: CPT

## 2024-01-11 DIAGNOSIS — R92.8 OTHER ABNORMAL AND INCONCLUSIVE FINDINGS ON DIAGNOSTIC IMAGING OF BREAST: ICD-10-CM

## 2024-02-05 ENCOUNTER — APPOINTMENT (OUTPATIENT)
Dept: SURGERY | Facility: CLINIC | Age: 69
End: 2024-02-05

## 2024-02-21 ENCOUNTER — APPOINTMENT (OUTPATIENT)
Dept: HEMATOLOGY ONCOLOGY | Facility: CLINIC | Age: 69
End: 2024-02-21

## 2024-03-19 ENCOUNTER — OUTPATIENT (OUTPATIENT)
Dept: OUTPATIENT SERVICES | Facility: HOSPITAL | Age: 69
LOS: 1 days | End: 2024-03-19
Payer: MEDICARE

## 2024-03-19 ENCOUNTER — APPOINTMENT (OUTPATIENT)
Dept: RADIATION ONCOLOGY | Facility: HOSPITAL | Age: 69
End: 2024-03-19
Payer: MEDICARE

## 2024-03-19 VITALS
OXYGEN SATURATION: 100 % | RESPIRATION RATE: 16 BRPM | HEART RATE: 71 BPM | DIASTOLIC BLOOD PRESSURE: 84 MMHG | SYSTOLIC BLOOD PRESSURE: 144 MMHG | BODY MASS INDEX: 24.89 KG/M2 | TEMPERATURE: 97.8 F | WEIGHT: 145 LBS

## 2024-03-19 DIAGNOSIS — Z98.890 OTHER SPECIFIED POSTPROCEDURAL STATES: Chronic | ICD-10-CM

## 2024-03-19 DIAGNOSIS — C50.311 MALIGNANT NEOPLASM OF LOWER-INNER QUADRANT OF RIGHT FEMALE BREAST: ICD-10-CM

## 2024-03-19 PROCEDURE — 99213 OFFICE O/P EST LOW 20 MIN: CPT

## 2024-03-19 RX ORDER — CALCIUM CITRATE/VITAMIN D3 315MG-6.25
TABLET ORAL
Refills: 0 | Status: DISCONTINUED | COMMUNITY
End: 2024-03-19

## 2024-03-19 NOTE — PHYSICAL EXAM
[General Appearance - Well Developed] : well developed [General Appearance - Alert] : alert [] : no respiratory distress [General Appearance - In No Acute Distress] : in no acute distress [___] : a [unfilled] ~Ucm mastectomy scar [Sclera] : the sclera and conjunctiva were normal [Heart Rate And Rhythm] : heart rate and rhythm were normal [Heart Sounds] : normal S1 and S2 [Cervical Lymph Nodes Enlarged Posterior Bilaterally] : posterior cervical [Cervical Lymph Nodes Enlarged Anterior Bilaterally] : anterior cervical [Supraclavicular Lymph Nodes Enlarged Bilaterally] : supraclavicular [Axillary Lymph Nodes Enlarged Bilaterally] : axillary [Normal] : oriented to person, place and time, the affect was normal, the mood was normal and not anxious [de-identified] : She is examined in both the upright and supine positions.  The right breast has a well healed scar.   The left breast is unremarkable.  No palpable mass in either breast.

## 2024-03-19 NOTE — LETTER CLOSING
[Consult Closing:] : Thank you for allowing me to participate in the care of this patient.  If you have any questions, please do not hesitate to contact me. [Sincerely yours,] : Sincerely yours, [FreeTextEntry3] : Estrella Weeks M.D.   Electronically proofread and signed by:  Estrella Weeks MD Attending, Department of Radiation Medicine [facility]

## 2024-03-19 NOTE — HISTORY OF PRESENT ILLNESS
[FreeTextEntry1] : GOOD LOPEZ returns to clinic in follow up visit.  As you know, the patient is a 68-year-old female with invasive ductal carcinoma of the right breast, G1, ER/ME positive / HER 2 negative, [id number], Stage IA. She is s/p breast conserving surgery.  She received 5240cGy to the right breast 6/3/2023 to 6/29/2023. The patient tolerated treatments well. The patient had the expected side effects of fatigue and skin erythema. In the interim, patient feeling well. Denies any fatigue. She gets occasional twitches to right axillary region, but it is not too bothersome.  Follows up with Surgery and Heme/onc. Last Mammogram was in January, and it was benign. Letrozole managed by . She is tolerating letrozole. She keeps active and has a few trips set up for this year already.   Most recent Imaging:  Mammogram: 1/10/2024: Impression: No mammographic evidence of malignancy in either breast. Post lumpectomy changes of the right breast. Recommendation: As per post lumpectomy routine, a follow-up unilateral right mammogram is recommended. BI-RADS Category 2: Benign  Next Mammogram: July/2024  Upcoming appointments: Dr. Jerome 4/8/2024 Dr. Espinoza 8/14/2023

## 2024-03-19 NOTE — DISEASE MANAGEMENT
[Pathological] : TNM Stage: p [IA] : IA [FreeTextEntry4] : invasive ductal carcinoma of the right breast, G1, ER/[place] positive / HER 2 negative [TTNM] : 1b [MTNM] : 0 [NTNM] : 1mi [de-identified] : right breast

## 2024-03-19 NOTE — REVIEW OF SYSTEMS
[Constipation] : constipation [Negative] : Allergic/Immunologic [FreeTextEntry7] : manages with stool softeners/ increasing fiber

## 2024-03-20 DIAGNOSIS — C50.311 MALIGNANT NEOPLASM OF LOWER-INNER QUADRANT OF RIGHT FEMALE BREAST: ICD-10-CM

## 2024-04-08 ENCOUNTER — APPOINTMENT (OUTPATIENT)
Dept: SURGERY | Facility: CLINIC | Age: 69
End: 2024-04-08
Payer: MEDICARE

## 2024-04-08 VITALS
HEIGHT: 64 IN | SYSTOLIC BLOOD PRESSURE: 120 MMHG | HEART RATE: 74 BPM | WEIGHT: 145 LBS | DIASTOLIC BLOOD PRESSURE: 72 MMHG | BODY MASS INDEX: 24.75 KG/M2 | OXYGEN SATURATION: 96 % | TEMPERATURE: 97.5 F

## 2024-04-08 DIAGNOSIS — Z79.811 LONG TERM (CURRENT) USE OF AROMATASE INHIBITORS: ICD-10-CM

## 2024-04-08 DIAGNOSIS — C50.911 MALIGNANT NEOPLASM OF UNSPECIFIED SITE OF RIGHT FEMALE BREAST: ICD-10-CM

## 2024-04-08 DIAGNOSIS — Z17.0 MALIGNANT NEOPLASM OF UNSPECIFIED SITE OF RIGHT FEMALE BREAST: ICD-10-CM

## 2024-04-08 PROCEDURE — 99212 OFFICE O/P EST SF 10 MIN: CPT

## 2024-04-09 PROBLEM — Z79.811 USE OF AROMATASE INHIBITORS: Status: ACTIVE | Noted: 2023-05-19

## 2024-04-09 PROBLEM — C50.911 CARCINOMA OF RIGHT BREAST IN FEMALE, ESTROGEN RECEPTOR POSITIVE: Status: ACTIVE | Noted: 2023-03-01

## 2024-04-09 RX ORDER — FLUTICASONE PROPIONATE 50 UG/1
50 SPRAY, METERED NASAL
Qty: 16 | Refills: 0 | Status: ACTIVE | COMMUNITY
Start: 2024-03-19

## 2024-04-09 RX ORDER — AZELASTINE HYDROCHLORIDE 137 UG/1
137 SPRAY, METERED NASAL
Qty: 30 | Refills: 0 | Status: ACTIVE | COMMUNITY
Start: 2024-03-18

## 2024-04-09 RX ORDER — LACTULOSE 10 G/15ML
10 SOLUTION ORAL
Qty: 946 | Refills: 0 | Status: ACTIVE | COMMUNITY
Start: 2024-03-19

## 2024-04-09 NOTE — PHYSICAL EXAM
[No HSM] : no hepatosplenomegaly [de-identified] : Healthy [de-identified] : No adenopathy [de-identified] : Moderate to size and symmetrical, with no nipple discharge, nipple retraction, suspicious skin change bilaterally.  There is some contraction deformity in the right lower inner quadrant which is stable and not suspect, and which is no longer apparent when the patient is supine.  No axillary adenopathy bilaterally.

## 2024-04-09 NOTE — HISTORY OF PRESENT ILLNESS
[de-identified] : The patient returns for continued breast cancer surveillance.  She notes no new symptoms or changes in either breast.  Right breast WLE/SLNB/WBRT April 2023.  T1BN0i IDC, ER and KS positive, H ER 2 negative, with questionable LVI and  a RS of 10.  She remains on letrozole per Dr. Espinoza.

## 2024-04-15 ENCOUNTER — APPOINTMENT (OUTPATIENT)
Age: 69
End: 2024-04-15

## 2024-04-15 ENCOUNTER — APPOINTMENT (OUTPATIENT)
Dept: HEMATOLOGY ONCOLOGY | Facility: CLINIC | Age: 69
End: 2024-04-15

## 2024-04-17 ENCOUNTER — APPOINTMENT (OUTPATIENT)
Age: 69
End: 2024-04-17
Payer: MEDICARE

## 2024-04-17 ENCOUNTER — OUTPATIENT (OUTPATIENT)
Dept: OUTPATIENT SERVICES | Facility: HOSPITAL | Age: 69
LOS: 1 days | End: 2024-04-17
Payer: MEDICARE

## 2024-04-17 VITALS
SYSTOLIC BLOOD PRESSURE: 129 MMHG | DIASTOLIC BLOOD PRESSURE: 64 MMHG | TEMPERATURE: 98.6 F | OXYGEN SATURATION: 99 % | HEART RATE: 74 BPM | RESPIRATION RATE: 19 BRPM | BODY MASS INDEX: 24.75 KG/M2 | WEIGHT: 145 LBS | HEIGHT: 64 IN

## 2024-04-17 DIAGNOSIS — Z79.811 ENCOUNTER FOR THERAPEUTIC DRUG LVL MONITORING: ICD-10-CM

## 2024-04-17 DIAGNOSIS — Z98.890 OTHER SPECIFIED POSTPROCEDURAL STATES: Chronic | ICD-10-CM

## 2024-04-17 DIAGNOSIS — Z51.81 ENCOUNTER FOR THERAPEUTIC DRUG LVL MONITORING: ICD-10-CM

## 2024-04-17 DIAGNOSIS — Z17.0 MALIGNANT NEOPLASM OF LOWER-INNER QUADRANT OF RIGHT FEMALE BREAST: ICD-10-CM

## 2024-04-17 DIAGNOSIS — M85.852 OTHER SPECIFIED DISORDERS OF BONE DENSITY AND STRUCTURE, LEFT THIGH: ICD-10-CM

## 2024-04-17 DIAGNOSIS — C50.311 MALIGNANT NEOPLASM OF LOWER-INNER QUADRANT OF RIGHT FEMALE BREAST: ICD-10-CM

## 2024-04-17 PROCEDURE — 99213 OFFICE O/P EST LOW 20 MIN: CPT

## 2024-04-17 RX ORDER — LETROZOLE TABLETS 2.5 MG/1
2.5 TABLET, FILM COATED ORAL DAILY
Qty: 90 | Refills: 1 | Status: ACTIVE | COMMUNITY
Start: 2023-05-15 | End: 1900-01-01

## 2024-04-17 NOTE — HISTORY OF PRESENT ILLNESS
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 Jerome for consultation of adjuvant systemic therapy.  History goes back to 1/10/23 when she had a routine screening mammogram which showed a new right breast focal asymmetry in the lower inner R breast, anterior depth. She subsequently underwent R breast biopsy on 23. Biopsy showed invasive well to moderately differentiated ductal carcinoma with focal lobular features and signet ring cell formation, ER+ (95%), CA+ (90%) and HER2- (0 by IHC). She was referrred to Dr. Jerome and had MRI breast that showed biopsy-proven carcinoma in the right breast and no MRI evidence of malignancy in the left breast.   On 23, she underwent right LIQ mass, radiofrequency seed localized lumpectomy and right axillary SLNB. The pathology revealed 5.2 mm invasive well differentiated ductal carcinoma  with focal lobular features including signet ring cell formation. Non-extensive ductal carcinoma in situ (DCIS), solid type, intermediate nuclear grade. A focus suspicious for lymphovascular invasion is seen. All margins are negative for malignancy. One SLN is positive micro-metastatic carcinoma with the largest contiguous focus of which measures 0.47 mm. No extracapsular extension is identified. AJCC 8th Edition Pathologic Stage: pT1b, p(sn)N1mi, pMx. Oncotype RS is 10.   She has recovered well from surgery and is here today with her sister to discuss systemic adjuvant therapy.   PMH: HTN, HLD and hypothyroidism PSH: none Gyn hx: Menarche age 11, menopause age 54, , no breast feeding, no breast bx, no hx of OCP or HRT use Social: Social alcohol use, denies tobacco use Family hx: no family hx of cancer. [de-identified] : 8/14/23 GOOD is here for followup visit for state IB ([id number]) IDC of the right breast right breast, G1, ER/NH positive and Her-2 negative, s/p right breast lumpectomy and right axillary SLNB with negative margins. Oncotype RS is 10, in low risk range. She completed adjuvant breast radiotherapy. She has been on Letrozole since 5/2023 and tolerated well. She is feeling well.   4/17/24 GOOD is here for followup visit for state IB ([id number]) IDC of the right breast right breast, G1, ER/NH positive and Her-2 negative, s/p right breast lumpectomy and right axillary SLNB with negative margins. Oncotype RS is 10, in low risk range. She completed adjuvant breast radiotherapy. She has been on Letrozole since 5/2023 and tolerating well. She takes Vitamin D but not Calcium due to constipation. She is feeling well, offers no new complaints. She had colonoscopy last year and is UTD with PCP and gyne.

## 2024-04-17 NOTE — ASSESSMENT
[FreeTextEntry1] : 69 yo female has state IB ([id number]) IDC of the right breast right breast, G1, ER/DC positive and Her-2 negative, s/p right breast lumpectomy and right axillary SLNB with negative margins. S/P RT on 6/2023. She was started on Letrozole in 5/2023. Oncotype RS is 10, in low risk range.   Assessment and Plan: -- Continue Letrozole daily, Rx sent. -- Breast exam today did not reveal palpable abnormality. Bilateral dx mammo on 1/10/24 reviewed. Benign post-surgical changes in the right breast and no suspicious findings in either breast. She is schedule for right dx mammo on 8/2024, pt has script. -- Mild osteopenia. Bone density on 5/17/23 reviewed and showed osteopenia of the femoral neck with T score -1.5.   Continue to take Vitamin D and Calcium. Advised to take Calcium every other day since it causes constipation and regular exercise.  -- Followup with Dr. Jerome and Dr. Weeks as scheduled.  -- Follow up with PCP, GI and gyne for health maintenance and csreenings.  RTO for followup in 6 months.

## 2024-04-17 NOTE — CONSULT LETTER
[Dear  ___] : Dear  [unfilled], [Courtesy Letter:] : I had the pleasure of seeing your patient, [unfilled], in my office today. [Please see my note below.] : Please see my note below. [Sincerely,] : Sincerely, [DrCrissy  ___] : Dr. ROSS [FreeTextEntry3] : Shirin Espinoza MD

## 2024-04-17 NOTE — PHYSICAL EXAM
[Fully active, able to carry on all pre-disease performance without restriction] : Status 0 - Fully active, able to carry on all pre-disease performance without restriction [Normal] : pharynx is unremarkable, moist mucus membrane, no oral lesions [de-identified] : Wears glasses [de-identified] : S/P right breast lumpectomy and right axillary SLNB. Surgical incisions are healed well. Breast exam showed no palpable abnormality.

## 2024-04-18 DIAGNOSIS — C50.311 MALIGNANT NEOPLASM OF LOWER-INNER QUADRANT OF RIGHT FEMALE BREAST: ICD-10-CM

## 2024-07-10 ENCOUNTER — OUTPATIENT (OUTPATIENT)
Dept: OUTPATIENT SERVICES | Facility: HOSPITAL | Age: 69
LOS: 1 days | End: 2024-07-10
Payer: MEDICARE

## 2024-07-10 ENCOUNTER — RESULT REVIEW (OUTPATIENT)
Age: 69
End: 2024-07-10

## 2024-07-10 DIAGNOSIS — Z98.890 OTHER SPECIFIED POSTPROCEDURAL STATES: Chronic | ICD-10-CM

## 2024-07-10 DIAGNOSIS — C50.911 MALIGNANT NEOPLASM OF UNSPECIFIED SITE OF RIGHT FEMALE BREAST: ICD-10-CM

## 2024-07-10 DIAGNOSIS — Z12.31 ENCOUNTER FOR SCREENING MAMMOGRAM FOR MALIGNANT NEOPLASM OF BREAST: ICD-10-CM

## 2024-07-10 PROCEDURE — G0279: CPT

## 2024-07-10 PROCEDURE — 77065 DX MAMMO INCL CAD UNI: CPT | Mod: 26,RT

## 2024-07-10 PROCEDURE — G0279: CPT | Mod: 26

## 2024-07-10 PROCEDURE — 77065 DX MAMMO INCL CAD UNI: CPT | Mod: RT

## 2024-07-11 DIAGNOSIS — C50.911 MALIGNANT NEOPLASM OF UNSPECIFIED SITE OF RIGHT FEMALE BREAST: ICD-10-CM

## 2024-08-12 ENCOUNTER — APPOINTMENT (OUTPATIENT)
Dept: SURGERY | Facility: CLINIC | Age: 69
End: 2024-08-12

## 2024-08-12 VITALS
HEIGHT: 64 IN | SYSTOLIC BLOOD PRESSURE: 126 MMHG | OXYGEN SATURATION: 99 % | DIASTOLIC BLOOD PRESSURE: 80 MMHG | WEIGHT: 143 LBS | BODY MASS INDEX: 24.41 KG/M2 | HEART RATE: 70 BPM

## 2024-08-12 DIAGNOSIS — Z17.0 MALIGNANT NEOPLASM OF LOWER-INNER QUADRANT OF RIGHT FEMALE BREAST: ICD-10-CM

## 2024-08-12 DIAGNOSIS — Z98.890 OTHER SPECIFIED POSTPROCEDURAL STATES: ICD-10-CM

## 2024-08-12 DIAGNOSIS — Z79.811 LONG TERM (CURRENT) USE OF AROMATASE INHIBITORS: ICD-10-CM

## 2024-08-12 DIAGNOSIS — C50.311 MALIGNANT NEOPLASM OF LOWER-INNER QUADRANT OF RIGHT FEMALE BREAST: ICD-10-CM

## 2024-08-12 PROCEDURE — 99212 OFFICE O/P EST SF 10 MIN: CPT

## 2024-08-14 NOTE — HISTORY OF PRESENT ILLNESS
[de-identified] : The patient returns to the office for continued breast cancer surveillance. She notes no new symptoms or changes in either breast.  Right breast WLE/SLNB/WBRT in April 2023 for T1BN0i IDC, ER and MI positive, HER2 negative, with questionable LVI and  a RS of 10. She remains on Letrozole per Dr. Espinoza.

## 2024-08-14 NOTE — ASSESSMENT
[FreeTextEntry1] : Ms. Magda Mcbried is a 68-year-old female who returns to the office for continued breast cancer surveillance.   Benign breast cancer surveillance examination. Her next bilateral diagnostic mammogram will be done in January 2025. An appropriate requisition was provided. She will return to the office in 3 to 4 months for re-exam, or sooner as needed. She is happy with the assessment and plan. All of her questions were answered.

## 2024-08-14 NOTE — PHYSICAL EXAM
[de-identified] : Healthy [de-identified] : No adenopathy [de-identified] : Moderate to size and symmetrical, with no nipple discharge, nipple retraction,or suspicious skin change bilaterally.  There is some contraction deformity in the right lower inner quadrant which is stable and not suspect, and which is no longer apparent when the patient is supine.  No axillary adenopathy bilaterally.

## 2024-08-14 NOTE — ASSESSMENT
[FreeTextEntry1] : Ms. Magda Mcbride is a 68-year-old female who returns to the office for continued breast cancer surveillance.   Benign breast cancer surveillance examination. Her next bilateral diagnostic mammogram will be done in January 2025. An appropriate requisition was provided. She will return to the office in 3 to 4 months for re-exam, or sooner as needed. She is happy with the assessment and plan. All of her questions were answered.

## 2024-08-14 NOTE — DATA REVIEWED
[FreeTextEntry1] : Reviewed prior office notes and breast imaging. Right diagnostic mammogram from July 2024 was benign. As per lumpectomy routine, a follow-up bilateral mammogram is recommended in 6 months.

## 2024-08-14 NOTE — REVIEW OF SYSTEMS
Post Reclast body aches, fever  It has been fifth day and she still continues to have body aches. No exposure to Covid, no sick contacts  Had arthralgia before Covid  Reported eye redness, examined eyes through doxy. me, no severe erythema    Discussed with the patient, NSAIDs with Pepcid, hydration, to call if no improvement.     Spent 13 minutes [Negative] : Respiratory

## 2024-08-14 NOTE — HISTORY OF PRESENT ILLNESS
[de-identified] : The patient returns to the office for continued breast cancer surveillance. She notes no new symptoms or changes in either breast.  Right breast WLE/SLNB/WBRT in April 2023 for T1BN0i IDC, ER and OK positive, HER2 negative, with questionable LVI and  a RS of 10. She remains on Letrozole per Dr. Espinoza.

## 2024-08-14 NOTE — PHYSICAL EXAM
[de-identified] : Healthy [de-identified] : No adenopathy [de-identified] : Moderate to size and symmetrical, with no nipple discharge, nipple retraction,or suspicious skin change bilaterally.  There is some contraction deformity in the right lower inner quadrant which is stable and not suspect, and which is no longer apparent when the patient is supine.  No axillary adenopathy bilaterally.

## 2024-10-22 ENCOUNTER — NON-APPOINTMENT (OUTPATIENT)
Age: 69
End: 2024-10-22

## 2024-10-23 ENCOUNTER — APPOINTMENT (OUTPATIENT)
Dept: RADIATION ONCOLOGY | Facility: HOSPITAL | Age: 69
End: 2024-10-23

## 2024-10-23 ENCOUNTER — OUTPATIENT (OUTPATIENT)
Dept: OUTPATIENT SERVICES | Facility: HOSPITAL | Age: 69
LOS: 1 days | End: 2024-10-23
Payer: MEDICARE

## 2024-10-23 ENCOUNTER — APPOINTMENT (OUTPATIENT)
Age: 69
End: 2024-10-23
Payer: MEDICARE

## 2024-10-23 VITALS
HEART RATE: 81 BPM | TEMPERATURE: 98 F | RESPIRATION RATE: 19 BRPM | HEIGHT: 64 IN | BODY MASS INDEX: 24.59 KG/M2 | DIASTOLIC BLOOD PRESSURE: 74 MMHG | WEIGHT: 144 LBS | SYSTOLIC BLOOD PRESSURE: 134 MMHG | OXYGEN SATURATION: 99 %

## 2024-10-23 VITALS
RESPIRATION RATE: 16 BRPM | BODY MASS INDEX: 24.78 KG/M2 | DIASTOLIC BLOOD PRESSURE: 80 MMHG | WEIGHT: 144.38 LBS | SYSTOLIC BLOOD PRESSURE: 123 MMHG | HEART RATE: 76 BPM | TEMPERATURE: 97.8 F | OXYGEN SATURATION: 100 %

## 2024-10-23 DIAGNOSIS — Z98.890 OTHER SPECIFIED POSTPROCEDURAL STATES: Chronic | ICD-10-CM

## 2024-10-23 DIAGNOSIS — Z92.3 PERSONAL HISTORY OF IRRADIATION: ICD-10-CM

## 2024-10-23 DIAGNOSIS — M85.80 OTHER SPECIFIED DISORDERS OF BONE DENSITY AND STRUCTURE, UNSPECIFIED SITE: ICD-10-CM

## 2024-10-23 DIAGNOSIS — C50.311 MALIGNANT NEOPLASM OF LOWER-INNER QUADRANT OF RIGHT FEMALE BREAST: ICD-10-CM

## 2024-10-23 DIAGNOSIS — Z78.0 OTHER SPECIFIED DISORDERS OF BONE DENSITY AND STRUCTURE, UNSPECIFIED SITE: ICD-10-CM

## 2024-10-23 DIAGNOSIS — Z17.0 MALIGNANT NEOPLASM OF LOWER-INNER QUADRANT OF RIGHT FEMALE BREAST: ICD-10-CM

## 2024-10-23 DIAGNOSIS — Z51.81 ENCOUNTER FOR THERAPEUTIC DRUG LVL MONITORING: ICD-10-CM

## 2024-10-23 DIAGNOSIS — Z79.811 ENCOUNTER FOR THERAPEUTIC DRUG LVL MONITORING: ICD-10-CM

## 2024-10-23 PROCEDURE — 99214 OFFICE O/P EST MOD 30 MIN: CPT

## 2024-10-23 PROCEDURE — 99213 OFFICE O/P EST LOW 20 MIN: CPT

## 2024-10-23 RX ORDER — FEXOFENADINE HYDROCHLORIDE 180 MG/1
180 TABLET, FILM COATED ORAL
Refills: 0 | Status: ACTIVE | COMMUNITY

## 2024-10-23 RX ORDER — VITAMIN K2 90 MCG
1000 CAPSULE ORAL
Refills: 0 | Status: ACTIVE | COMMUNITY

## 2024-10-23 RX ORDER — FOLIC ACID 0.8 MG
500 TABLET ORAL
Refills: 0 | Status: ACTIVE | COMMUNITY

## 2024-10-24 DIAGNOSIS — C50.311 MALIGNANT NEOPLASM OF LOWER-INNER QUADRANT OF RIGHT FEMALE BREAST: ICD-10-CM

## 2024-10-27 PROBLEM — M85.80 OSTEOPENIA AFTER MENOPAUSE: Status: ACTIVE | Noted: 2024-10-27

## 2024-12-09 ENCOUNTER — APPOINTMENT (OUTPATIENT)
Dept: SURGERY | Facility: CLINIC | Age: 69
End: 2024-12-09
Payer: MEDICARE

## 2024-12-09 DIAGNOSIS — C50.311 MALIGNANT NEOPLASM OF LOWER-INNER QUADRANT OF RIGHT FEMALE BREAST: ICD-10-CM

## 2024-12-09 DIAGNOSIS — Z92.3 PERSONAL HISTORY OF IRRADIATION: ICD-10-CM

## 2024-12-09 DIAGNOSIS — Z98.890 OTHER SPECIFIED POSTPROCEDURAL STATES: ICD-10-CM

## 2024-12-09 DIAGNOSIS — Z79.811 LONG TERM (CURRENT) USE OF AROMATASE INHIBITORS: ICD-10-CM

## 2024-12-09 DIAGNOSIS — Z17.0 MALIGNANT NEOPLASM OF LOWER-INNER QUADRANT OF RIGHT FEMALE BREAST: ICD-10-CM

## 2024-12-09 PROCEDURE — 99213 OFFICE O/P EST LOW 20 MIN: CPT

## 2025-01-14 ENCOUNTER — RESULT REVIEW (OUTPATIENT)
Age: 70
End: 2025-01-14

## 2025-01-14 ENCOUNTER — OUTPATIENT (OUTPATIENT)
Dept: OUTPATIENT SERVICES | Facility: HOSPITAL | Age: 70
LOS: 1 days | End: 2025-01-14
Payer: MEDICARE

## 2025-01-14 DIAGNOSIS — Z98.890 OTHER SPECIFIED POSTPROCEDURAL STATES: Chronic | ICD-10-CM

## 2025-01-14 DIAGNOSIS — R92.8 OTHER ABNORMAL AND INCONCLUSIVE FINDINGS ON DIAGNOSTIC IMAGING OF BREAST: ICD-10-CM

## 2025-01-14 DIAGNOSIS — Z00.8 ENCOUNTER FOR OTHER GENERAL EXAMINATION: ICD-10-CM

## 2025-01-14 PROCEDURE — G0279: CPT | Mod: 26

## 2025-01-14 PROCEDURE — 77066 DX MAMMO INCL CAD BI: CPT | Mod: 26

## 2025-01-14 PROCEDURE — G0279: CPT

## 2025-01-14 PROCEDURE — 77066 DX MAMMO INCL CAD BI: CPT

## 2025-01-15 DIAGNOSIS — R92.8 OTHER ABNORMAL AND INCONCLUSIVE FINDINGS ON DIAGNOSTIC IMAGING OF BREAST: ICD-10-CM

## 2025-03-04 ENCOUNTER — APPOINTMENT (OUTPATIENT)
Dept: ORTHOPEDIC SURGERY | Facility: CLINIC | Age: 70
End: 2025-03-04

## 2025-03-07 ENCOUNTER — APPOINTMENT (OUTPATIENT)
Dept: ORTHOPEDIC SURGERY | Facility: CLINIC | Age: 70
End: 2025-03-07
Payer: MEDICARE

## 2025-03-07 VITALS — HEIGHT: 64 IN | BODY MASS INDEX: 21.17 KG/M2 | WEIGHT: 124 LBS

## 2025-03-07 DIAGNOSIS — S83.232A COMPLEX TEAR OF MEDIAL MENISCUS, CURRENT INJURY, LEFT KNEE, INITIAL ENCOUNTER: ICD-10-CM

## 2025-03-07 DIAGNOSIS — Z78.9 OTHER SPECIFIED HEALTH STATUS: ICD-10-CM

## 2025-03-07 PROCEDURE — 73564 X-RAY EXAM KNEE 4 OR MORE: CPT | Mod: 50

## 2025-03-07 PROCEDURE — 99213 OFFICE O/P EST LOW 20 MIN: CPT

## 2025-03-07 PROCEDURE — 73522 X-RAY EXAM HIPS BI 3-4 VIEWS: CPT

## 2025-03-07 PROCEDURE — 99203 OFFICE O/P NEW LOW 30 MIN: CPT

## 2025-04-11 ENCOUNTER — APPOINTMENT (OUTPATIENT)
Dept: ORTHOPEDIC SURGERY | Facility: CLINIC | Age: 70
End: 2025-04-11
Payer: MEDICARE

## 2025-04-11 DIAGNOSIS — S83.232A COMPLEX TEAR OF MEDIAL MENISCUS, CURRENT INJURY, LEFT KNEE, INITIAL ENCOUNTER: ICD-10-CM

## 2025-04-11 PROCEDURE — 99211 OFF/OP EST MAY X REQ PHY/QHP: CPT | Mod: NC,93

## 2025-04-21 ENCOUNTER — OUTPATIENT (OUTPATIENT)
Dept: OUTPATIENT SERVICES | Facility: HOSPITAL | Age: 70
LOS: 1 days | End: 2025-04-21
Payer: MEDICARE

## 2025-04-21 ENCOUNTER — APPOINTMENT (OUTPATIENT)
Age: 70
End: 2025-04-21
Payer: MEDICARE

## 2025-04-21 VITALS
TEMPERATURE: 98 F | WEIGHT: 124 LBS | OXYGEN SATURATION: 99 % | SYSTOLIC BLOOD PRESSURE: 130 MMHG | HEIGHT: 64 IN | BODY MASS INDEX: 21.17 KG/M2 | HEART RATE: 80 BPM | DIASTOLIC BLOOD PRESSURE: 82 MMHG | RESPIRATION RATE: 19 BRPM

## 2025-04-21 DIAGNOSIS — Z98.890 OTHER SPECIFIED POSTPROCEDURAL STATES: Chronic | ICD-10-CM

## 2025-04-21 DIAGNOSIS — Z79.811 ENCOUNTER FOR THERAPEUTIC DRUG LVL MONITORING: ICD-10-CM

## 2025-04-21 DIAGNOSIS — M85.80 OTHER SPECIFIED DISORDERS OF BONE DENSITY AND STRUCTURE, UNSPECIFIED SITE: ICD-10-CM

## 2025-04-21 DIAGNOSIS — Z78.0 OTHER SPECIFIED DISORDERS OF BONE DENSITY AND STRUCTURE, UNSPECIFIED SITE: ICD-10-CM

## 2025-04-21 DIAGNOSIS — Z51.81 ENCOUNTER FOR THERAPEUTIC DRUG LVL MONITORING: ICD-10-CM

## 2025-04-21 DIAGNOSIS — C50.311 MALIGNANT NEOPLASM OF LOWER-INNER QUADRANT OF RIGHT FEMALE BREAST: ICD-10-CM

## 2025-04-21 DIAGNOSIS — Z17.0 MALIGNANT NEOPLASM OF UNSPECIFIED SITE OF RIGHT FEMALE BREAST: ICD-10-CM

## 2025-04-21 DIAGNOSIS — C50.911 MALIGNANT NEOPLASM OF UNSPECIFIED SITE OF RIGHT FEMALE BREAST: ICD-10-CM

## 2025-04-21 PROCEDURE — 99214 OFFICE O/P EST MOD 30 MIN: CPT

## 2025-04-22 DIAGNOSIS — C50.311 MALIGNANT NEOPLASM OF LOWER-INNER QUADRANT OF RIGHT FEMALE BREAST: ICD-10-CM

## 2025-05-30 ENCOUNTER — APPOINTMENT (OUTPATIENT)
Dept: ORTHOPEDIC SURGERY | Facility: CLINIC | Age: 70
End: 2025-05-30

## 2025-06-14 ENCOUNTER — NON-APPOINTMENT (OUTPATIENT)
Age: 70
End: 2025-06-14

## 2025-06-16 ENCOUNTER — APPOINTMENT (OUTPATIENT)
Dept: SURGERY | Facility: CLINIC | Age: 70
End: 2025-06-16
Payer: MEDICARE

## 2025-06-16 VITALS
DIASTOLIC BLOOD PRESSURE: 74 MMHG | WEIGHT: 124 LBS | HEIGHT: 64 IN | BODY MASS INDEX: 21.17 KG/M2 | HEART RATE: 76 BPM | OXYGEN SATURATION: 98 % | SYSTOLIC BLOOD PRESSURE: 126 MMHG | TEMPERATURE: 97 F

## 2025-06-16 PROCEDURE — 99212 OFFICE O/P EST SF 10 MIN: CPT

## 2025-07-16 ENCOUNTER — OUTPATIENT (OUTPATIENT)
Dept: OUTPATIENT SERVICES | Facility: HOSPITAL | Age: 70
LOS: 1 days | End: 2025-07-16
Payer: MEDICARE

## 2025-07-16 ENCOUNTER — RESULT REVIEW (OUTPATIENT)
Age: 70
End: 2025-07-16

## 2025-07-16 DIAGNOSIS — Z98.890 OTHER SPECIFIED POSTPROCEDURAL STATES: Chronic | ICD-10-CM

## 2025-07-16 DIAGNOSIS — R92.8 OTHER ABNORMAL AND INCONCLUSIVE FINDINGS ON DIAGNOSTIC IMAGING OF BREAST: ICD-10-CM

## 2025-07-16 DIAGNOSIS — Z12.31 ENCOUNTER FOR SCREENING MAMMOGRAM FOR MALIGNANT NEOPLASM OF BREAST: ICD-10-CM

## 2025-07-16 PROCEDURE — G0279: CPT | Mod: 26

## 2025-07-16 PROCEDURE — G0279: CPT

## 2025-07-16 PROCEDURE — 77065 DX MAMMO INCL CAD UNI: CPT | Mod: RT

## 2025-07-16 PROCEDURE — 77065 DX MAMMO INCL CAD UNI: CPT | Mod: 26,RT

## 2025-07-17 DIAGNOSIS — R92.8 OTHER ABNORMAL AND INCONCLUSIVE FINDINGS ON DIAGNOSTIC IMAGING OF BREAST: ICD-10-CM
